# Patient Record
Sex: MALE | Race: WHITE | NOT HISPANIC OR LATINO | Employment: FULL TIME | URBAN - METROPOLITAN AREA
[De-identification: names, ages, dates, MRNs, and addresses within clinical notes are randomized per-mention and may not be internally consistent; named-entity substitution may affect disease eponyms.]

---

## 2017-05-03 ENCOUNTER — OFFICE VISIT (OUTPATIENT)
Dept: CARDIOLOGY | Facility: CLINIC | Age: 47
End: 2017-05-03
Attending: INTERNAL MEDICINE
Payer: COMMERCIAL

## 2017-05-03 VITALS
DIASTOLIC BLOOD PRESSURE: 84 MMHG | WEIGHT: 233 LBS | BODY MASS INDEX: 33.36 KG/M2 | HEART RATE: 68 BPM | HEIGHT: 70 IN | SYSTOLIC BLOOD PRESSURE: 130 MMHG

## 2017-05-03 DIAGNOSIS — E88.810 METABOLIC SYNDROME: ICD-10-CM

## 2017-05-03 DIAGNOSIS — Z95.5 STATUS POST INSERTION OF DRUG ELUTING CORONARY ARTERY STENT: ICD-10-CM

## 2017-05-03 DIAGNOSIS — I25.10 CORONARY ARTERY DISEASE INVOLVING NATIVE CORONARY ARTERY OF NATIVE HEART WITHOUT ANGINA PECTORIS: Primary | ICD-10-CM

## 2017-05-03 DIAGNOSIS — E78.5 HYPERLIPIDEMIA LDL GOAL <130: ICD-10-CM

## 2017-05-03 PROCEDURE — 99214 OFFICE O/P EST MOD 30 MIN: CPT | Performed by: INTERNAL MEDICINE

## 2017-05-03 RX ORDER — ATORVASTATIN CALCIUM 80 MG/1
TABLET, FILM COATED ORAL
COMMUNITY
Start: 2017-03-02 | End: 2018-05-14

## 2017-05-03 NOTE — MR AVS SNAPSHOT
After Visit Summary   5/3/2017    Henrique Nelson    MRN: [id number]           Patient Information     Date Of Birth          1970        Visit Information        Provider Department      5/3/2017 7:45 AM Silvio Michael MD [facility]        Today's Diagnoses     Coronary artery disease involving native coronary artery of native heart without angina pectoris        Hyperlipidemia LDL goal <130        Elevated blood pressure (not hypertension)        Metabolic syndrome           Follow-ups after your visit        Additional Services     Follow-Up with Cardiologist                 Future tests that were ordered for you today     Open Future Orders        Priority Expected Expires Ordered    Lipid Profile Routine 5/3/2018 6/7/2018 5/3/2017    Exercise Stress Echocardiogram Routine 5/3/2018 6/7/2018 5/3/2017    Follow-Up with Cardiologist Routine 5/3/2018 9/15/2018 5/3/2017            Who to contact     If you have questions or need follow up information about today's clinic visit or your schedule please contact [facility] directly at [phone].  Normal or non-critical lab and imaging results will be communicated to you by PageFairhart, letter or phone within 4 business days after the clinic has received the results. If you do not hear from us within 7 days, please contact the clinic through Backpackt or phone. If you have a critical or abnormal lab result, we will notify you by phone as soon as possible.  Submit refill requests through Sagoon or call your pharmacy and they will forward the refill request to us. Please allow 3 business days for your refill to be completed.          Additional Information About Your Visit        PageFairhart Information     Sagoon gives you secure access to your electronic health record. If you see a primary care provider, you can also send messages to your care team and make appointments.  "If you have questions, please call your primary care clinic.  If you do not have a primary care provider, please call [phone] and they will assist you.        Care EveryWhere ID     This is your Care EveryWhere ID. This could be used by other organizations to access your [facility] medical records  [id number]        Your Vitals Were     Pulse Height BMI (Body Mass Index)             68 1.778 m (5' 10\") 33.43 kg/m2          Blood Pressure from Last 3 Encounters:   05/03/17 130/84   09/12/16 120/70   08/29/16 116/64    Weight from Last 3 Encounters:   05/03/17 105.7 kg (233 lb)   09/12/16 103.9 kg (229 lb)   08/22/16 103.4 kg (228 lb)              We Performed the Following     Follow-Up with Cardiologist          Today's Medication Changes          These changes are accurate as of: 5/3/17  8:10 AM.  If you have any questions, ask your nurse or doctor.               Stop taking these medicines if you haven't already. Please contact your care team if you have questions.     SIMVASTATIN PO   Stopped by:  Silvio Michael MD                    Primary Care Provider Office Phone # Fax #    Gregory G Schoen, [phone]       [facility] [address] [place] [address]        Thank you!     Thank you for choosing [facility]  for your care. Our goal is always to provide you with excellent care. Hearing back from our patients is one way we can continue to improve our services. Please take a few minutes to complete the written survey that you may receive in the mail after your visit with us. Thank you!             Your Updated Medication List - Protect others around you: Learn how to safely use, store and throw away your medicines at www.disposemymeds.org.          This list is accurate as of: 5/3/17  8:10 AM.  Always use your most recent med list.                   Brand Name Dispense Instructions for use    albuterol 108 (90 BASE) " MCG/ACT Inhaler   Generic drug:  albuterol     1    INHALE 1 TO 2 PUFFS EVERY 4 TO 6 HOURS AS NEEDED       aspirin 325 MG EC tablet      ONE DAILY       atorvastatin 80 MG tablet    LIPITOR         nitroglycerin 0.4 MG sublingual tablet    NITROSTAT    60 tablet    Place 1 tablet under the tongue See Admin Instructions. for chest pain

## 2017-05-03 NOTE — PROGRESS NOTES
HPI and Plan:   See dictation:[id number]    Orders Placed This Encounter   Procedures     Lipid Profile     Follow-Up with Cardiologist     Exercise Stress Echocardiogram       Orders Placed This Encounter   Medications     atorvastatin (LIPITOR) 80 MG tablet       Medications Discontinued During This Encounter   Medication Reason     SIMVASTATIN PO Alternate therapy         Encounter Diagnoses   Name Primary?     Coronary artery disease involving native coronary artery of native heart without angina pectoris      Hyperlipidemia LDL goal <130      Elevated blood pressure (not hypertension)      Metabolic syndrome        CURRENT MEDICATIONS:  Current Outpatient Prescriptions   Medication Sig Dispense Refill     nitroglycerin (NITROSTAT) 0.4 MG SL tablet Place 1 tablet under the tongue See Admin Instructions. for chest pain 60 tablet 3     ASPIRIN 325 MG PO TBEC ONE DAILY  3     PROAIR  (90 BASE) MCG/ACT IN AERS INHALE 1 TO 2 PUFFS EVERY 4 TO 6 HOURS AS NEEDED 1 1 YEAR     atorvastatin (LIPITOR) 80 MG tablet        [DISCONTINUED] indomethacin (INDOCIN) 50 MG capsule Take 1 capsule (50 mg) by mouth 3 times daily (with meals) for 10 days 30 capsule 0       ALLERGIES     Allergies   Allergen Reactions     Cat Hair [Cats]      No Known Drug Allergies        PAST MEDICAL HISTORY:  Past Medical History:   Diagnosis Date     Allergic rhinitis, cause unspecified     Allergic rhinitis     CAD (coronary artery disease)     Drug eluting stent to occluded diagonal on 4/14/10     Esophageal reflux      HLD (hyperlipidaemia)      Metabolic syndrome      Mild intermittent asthma      NONSPECIFIC MEDICAL HISTORY 1997    Avulsion fracture, right ankle     Old myocardial infarction April 2010 April 2010       PAST SURGICAL HISTORY:  Past Surgical History:   Procedure Laterality Date     C NONSPECIFIC PROCEDURE  1996    Sinus surgery     CARDIAC CATHERIZATION  04/14/10     CORONARY ANGIOGRAPHY ADULT ORDER  4/14/10    4/14/10: JOELLEN  "to totally occluded diagonal     HC TOOTH EXTRACTION W/FORCEP      [place] teeth     STENT, CORONARY, BIJAL  4/2010       FAMILY HISTORY:  Family History   Problem Relation Age of Onset     Hypertension Maternal Grandfather      Allergies Sister      Breast Cancer Mother      CANCER Mother      Ovarian     DIABETES Maternal Aunt      insulin       SOCIAL HISTORY:  Social History     Social History     Marital status:      Spouse name: Alberta     Number of children: 0     Years of education: N/A     Occupational History     Shop work      Social History Main Topics     Smoking status: Former Smoker     Types: Cigars     Smokeless tobacco: Never Used      Comment: 04/12/2010     Alcohol use 0.0 oz/week     0 Standard drinks or equivalent per week      Comment: occasional     Drug use: No     Sexual activity: Yes     Partners: Female     Other Topics Concern     Parent/Sibling W/ Cabg, Mi Or Angioplasty Before 65f 55m? No     Caffeine Concern No     dt. mountain dew- 2 a day (16oz)     Exercise No     Seat Belt Yes     Social History Narrative       Review of Systems:  Skin:  Negative       Eyes:  Positive for glasses    ENT:  Negative      Respiratory:  Negative       Cardiovascular:    Positive for;fatigue    Gastroenterology: Negative      Genitourinary:  not assessed      Musculoskeletal:  Negative      Neurologic:  Negative      Psychiatric:  Negative      Heme/Lymph/Imm:  Positive for allergies    Endocrine:  Negative        Physical Exam:  Vitals: /84 (BP Location: Left arm, Cuff Size: Adult Large)  Pulse 68  Ht 1.778 m (5' 10\")  Wt 105.7 kg (233 lb)  BMI 33.43 kg/m2    Constitutional:  cooperative, alert and oriented, well developed, well nourished, in no acute distress overweight      Skin:  warm and dry to the touch, no apparent skin lesions or masses noted        Head:  normocephalic, no masses or lesions        Eyes:  pupils equal and round, conjunctivae and lids unremarkable, sclera white, no " xanthalasma, EOMS intact, no nystagmus        ENT:  no pallor or cyanosis, dentition good        Neck:  carotid pulses are full and equal bilaterally, JVP normal, no carotid bruit, no thyromegaly        Chest:  normal breath sounds, clear to auscultation, normal A-P diameter, normal symmetry, normal respiratory excursion, no use of accessory muscles          Cardiac: regular rhythm, normal S1/S2, no S3 or S4, apical impulse not displaced, no murmurs, gallops or rubs                  Abdomen:  abdomen soft, non-tender, BS normoactive, no mass, no HSM, no bruits        Vascular: pulses full and equal, no bruits auscultated                                        Extremities and Back:  no deformities, clubbing, cyanosis, erythema observed;no edema              Neurological:  affect appropriate, oriented to time, person and place;no gross motor deficits              CC  Silvio Michael MD   [facility]  [address]  [place] [address]

## 2017-05-03 NOTE — PROGRESS NOTES
HISTORY OF PRESENT ILLNESS:  I again had the pleasure of seeing your patient, Henrique Nelson, at [facility] for evaluation of coronary artery disease and hyperlipidemia.  The patient is status post subendocardial myocardial infarction in 04/2010 with a totally occluded diagonal branch artery.  This was stented using a drug-eluting stent and the patient continues to deny angina pectoris.  His last stress echocardiogram 04/22/2013 demonstrated no evidence of ischemia or infarction.  The patient was able to exercise 12 minutes free of angina.  He did not have any valvular abnormalities.  The patient's exercise continues to diminish, now 1 or 2 times per week.  He walks on a treadmill for 20 minutes and uses a stationary bike for 15 minutes free of angina or shortness of breath.  He is a bit troubled by asthma this spring.  He is not smoking.  He denies myalgias or myopathy.  We have not performed a fasting lipid profile this year since they have been so stable the previous 3 years.  He notes that even his father has lost 35 pounds and is now using a gym membership.  The patient has reduced his red meat consumption and has now increased pork, fish and chicken.      PHYSICAL EXAMINATION:  Current blood pressure is 130/84, pulse is 68 and regular, weight is 233 pounds, an increase of 8 pounds from last year.  BMI is 34.  Chest is clear to auscultation.  Cardiac exam:  Regular rate and rhythm, normal S1 and S2 without gallop or murmur.  No JVD.  Pulses are intact without bruits.  Abdomen is soft, nontender without organomegaly.  No bruits.  Extremities without cyanosis, clubbing or edema.      ASSESSMENT:   1.  Henrique Nelson is a delightful 46-year-old male status post diagonal branch artery myocardial infarction and intracoronary stenting in 04/2010.  Last stress test was 2013 and was normal.  We are going to repeat this stress echocardiogram in 1 year.  The patient remains on aspirin and  atorvastatin lifelong.  His blood pressure is well controlled.  I spent 30 minutes with this patient, greater than 50% of that time was counseling on diet, exercise and weight loss.  We also discussed the anatomy of a coronary artery and why it is imperative that he exercises aerobically 4 times per week.   2.  The patient remains on high-dose atorvastatin with excellent lipid profile last year.  He denies myalgias or myopathy.  We will repeat this test in 1 year.      It is my pleasure to assist in the care of Mr. Henrique Casas.  I appreciate the opportunity to care for him.  I will see him again in 1 year or earlier on a p.r.n. basis.  All his questions were answered to his satisfaction.      Vida Wright MD      cc:   Gregory Schoen, MD   [address] [address]         VDIA WRIGHT MD, [facility]             D: 2017 08:16   T: 2017 14:54   MT: PHOEBE      Name:     HENRIQUE CASAS   MRN:      [id number]        Account:      [id number]   :      1970           Service Date: 2017      Document: [id number]

## 2017-05-03 NOTE — LETTER
5/3/2017    Gregory G. Schoen, MD  [facility]   [address]   [place] [address]    RE: Henrique Nelson       Dear Colleague,    I again had the pleasure of seeing your patient, Henrique Nelson, at [facility] for evaluation of coronary artery disease and hyperlipidemia.  The patient is status post subendocardial myocardial infarction in 04/2010 with a totally occluded diagonal branch artery.  This was stented using a drug-eluting stent and the patient continues to deny angina pectoris.  His last stress echocardiogram 04/22/2013 demonstrated no evidence of ischemia or infarction.  The patient was able to exercise 12 minutes free of angina.  He did not have any valvular abnormalities.  The patient's exercise continues to diminish, now 1 or 2 times per week.  He walks on a treadmill for 20 minutes and uses a stationary bike for 15 minutes free of angina or shortness of breath.  He is a bit troubled by asthma this spring.  He is not smoking.  He denies myalgias or myopathy.  We have not performed a fasting lipid profile this year since they have been so stable the previous 3 years.  He notes that even his father has lost 35 pounds and is now using a gym membership.  The patient has reduced his red meat consumption and has now increased pork, fish and chicken.      PHYSICAL EXAMINATION:  Current blood pressure is 130/84, pulse is 68 and regular, weight is 233 pounds, an increase of 8 pounds from last year.  BMI is 34.  Chest is clear to auscultation.  Cardiac exam:  Regular rate and rhythm, normal S1 and S2 without gallop or murmur.  No JVD.  Pulses are intact without bruits.  Abdomen is soft, nontender without organomegaly.  No bruits.  Extremities without cyanosis, clubbing or edema.     Outpatient Encounter Prescriptions as of 5/3/2017   Medication Sig Dispense Refill     nitroglycerin (NITROSTAT) 0.4 MG SL tablet Place 1 tablet under the tongue See Admin Instructions. for  chest pain 60 tablet 3     ASPIRIN 325 MG PO TBEC ONE DAILY  3     PROAIR  (90 BASE) MCG/ACT IN AERS INHALE 1 TO 2 PUFFS EVERY 4 TO 6 HOURS AS NEEDED 1 1 YEAR     atorvastatin (LIPITOR) 80 MG tablet        [DISCONTINUED] SIMVASTATIN PO        No facility-administered encounter medications on file as of 5/3/2017.       ASSESSMENT:   1.  Henrique Nelson is a delightful 46-year-old male status post diagonal branch artery myocardial infarction and intracoronary stenting in 04/2010.  Last stress test was 2013 and was normal.  We are going to repeat this stress echocardiogram in 1 year.  The patient remains on aspirin and atorvastatin lifelong.  His blood pressure is well controlled.  I spent 30 minutes with this patient, greater than 50% of that time was counseling on diet, exercise and weight loss.  We also discussed the anatomy of a coronary artery and why it is imperative that he exercises aerobically 4 times per week.   2.  The patient remains on high-dose atorvastatin with excellent lipid profile last year.  He denies myalgias or myopathy.  We will repeat this test in 1 year.      It is my pleasure to assist in the care of Mr. Henrique Nelson.  I appreciate the opportunity to care for him.  I will see him again in 1 year or earlier on a p.r.n. basis.  All his questions were answered to his satisfaction.     Sincerely,    Silvio Michael MD     [facility]

## 2017-06-13 DIAGNOSIS — E78.5 HYPERLIPIDEMIA LDL GOAL <130: ICD-10-CM

## 2017-06-14 RX ORDER — ATORVASTATIN CALCIUM 80 MG/1
TABLET, FILM COATED ORAL
Qty: 90 TABLET | Refills: 3 | Status: SHIPPED | OUTPATIENT
Start: 2017-06-14 | End: 2018-08-16

## 2017-06-14 NOTE — TELEPHONE ENCOUNTER
atorvastatin (LIPITOR) 80 MG tablet      Last Written Prescription Date:  3/2/17  Last Fill Quantity: ?,   # refills: ?  Last Office Visit with [facility], [facility] or [facility] prescribing provider: 8/4/16  Future Office visit:       Routing refill request to provider for review/approval because:  Medication is reported/historical

## 2017-10-25 ENCOUNTER — OFFICE VISIT (OUTPATIENT)
Dept: URGENT CARE | Facility: RETAIL CLINIC | Age: 47
End: 2017-10-25
Payer: COMMERCIAL

## 2017-10-25 VITALS
SYSTOLIC BLOOD PRESSURE: 128 MMHG | TEMPERATURE: 96.2 F | OXYGEN SATURATION: 96 % | HEART RATE: 71 BPM | DIASTOLIC BLOOD PRESSURE: 80 MMHG

## 2017-10-25 DIAGNOSIS — S30.861A TICK BITE OF ABDOMEN, INITIAL ENCOUNTER: Primary | ICD-10-CM
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
## 2017-10-25 DIAGNOSIS — A69.20 LYME DISEASE: ICD-10-CM

## 2017-10-25 PROCEDURE — 99213 OFFICE O/P EST LOW 20 MIN: CPT | Performed by: PHYSICIAN ASSISTANT

## 2017-10-25 RX ORDER — DOXYCYCLINE HYCLATE 100 MG
200 TABLET ORAL ONCE
Qty: 2 TABLET | Refills: 0 | Status: SHIPPED | OUTPATIENT
Start: 2017-10-25 | End: 2017-10-25

## 2017-10-25 NOTE — MR AVS SNAPSHOT
After Visit Summary   10/25/2017    Henrique Nelson    MRN: [id number]           Patient Information     Date Of Birth          1970        Visit Information        Provider Department      10/25/2017 10:00 AM Anneliese Mayberry PA-C [facility]        Today's Diagnoses     Lyme disease    -  1      Care Instructions      Preventing Lyme Disease  Ticks are small spider-like arachnids that feed on the blood of animals and humans. They can carry the bacteria that cause Lyme disease. The bacteria can pass to a person from a tick bite. (It is not passed from person to person.) Lyme disease can lead to serious health problems if it is not treated with antibiotics. The best way to prevent Lyme disease is to avoid being bitten by a tick.     The tick that carries Lyme disease is very small--about the size of a poppy seed.   Preventing tick bites  The disease is carried by the blacklegged tick, also called a  deer tick.  Young ticks called nymphs are the most likely to carry the bacteria. They are very small--about the size of a poppy seed. They can be found on deer, rodents, and birds. Often the animal brushes the tick onto leaves or other plants as it runs through the woods and then the tick lives in bushes, grasses, and dead leaves. The most active time of year for infected ticks varies by region of the country. In the [place] and [place], they are more active from April to September. In the [place], they may be more active from December to February. But you can still be bitten at other times of the year. Below are tips for protecting yourself from tick bites.  Protect your body    Wear clothes that protect you. Clothing can help protect you from tick bites. Wear long pants and long sleeves in outdoor areas where ticks may live. Tuck your shirt into your pants. Tuck pant legs into your socks. And wear light colors so you can more easily see ticks on your clothes.    Use insect repellent.  Spray insect repellent containing at least 20 percent DEET on your exposed skin. You can also use it on clothing, shoes, and camping gear. Avoid getting DEET on children s hands, mouth, or eyes. You can use products with permethrin on clothing, shoes, and camping gear. But do not spray permethrin on skin. It will cause a rash. Follow the directions on the package of the spray you use. For more information on bug sprays, visit the National Pesticide Information Center at http://npic.[facility].    Avoid tick-infested areas. Avoid brushing against grasses, bushes, and other plants. Avoid walking through dead leaves and other ground vegetation. Do not sit on fallen logs. And avoid areas with large numbers of deer and rodents.    Check yourself for ticks. After being outdoors, check your clothes and skin for ticks. Keep in mind that the ticks are about the size of a poppy seed. Use both a hand-held and a full-length mirror to view all of your skin. Pay special attention to areas with hair. Make sure to thoroughly check these areas:    Scalp    Behind the ears    Armpits    Belly button    Waist    Groin    Backs of the knees    Use the clothes dryer. Putting clothing or bedding into a clothes dryer for 1 hour at high heat has been shown to kill ticks.  Control ticks around your home    Create tick-free safety zones. Ticks that transmit Lyme disease live in ground vegetation. Ticks crawl onto people from shrubs and grasses in and around wooded areas. Cut bushes and other plants away from the deck or patio and any child play areas. Keep all grasses cut short. Remove dead leaves and other dead vegetation. Do not put children s play equipment near wooded areas. Put wood chips or gravel on the ground between lawns and wooded areas.    Use pesticides. You can apply them yourself or hire a pest control expert. States have different regulations about pesticides. Ask your local health department for information.    Keep deer away.  Deer often carry the ticks that can infect you with Lyme disease. Do not attempt to pet or feed deer. Ask your local [place] center about deer-resistant plants. Ask your local health department about deer control in your area.    Prevent ticks on pets. Pets can bring ticks into your home. Use tick control medicine as advised by your . Check your pet for ticks after it comes indoors. Pay special attention to the ears.    Ask about local tick-control methods. Some states have tick-control programs. Local health departments may be using methods that can help you control ticks at home.  If you have a tick  If you find a tick on your skin, do not panic. Most ticks don't carry Lyme disease. And the tick must be attached for 36 to 48 hours before it might infect you. If you find a tick on you, here s what to do:    If the tick is not yet attached to your skin, remove the tick with tweezers or a tissue. Flush it down the toilet. If you see a tick on your clothes, use a piece of tape to lift it off. Do not touch it with your bare hands.    If the tick is attached to your skin:    Carefully remove the tick with tweezers. If you don t have tweezers, use your fingers protected by a paper towel or a thin cloth. Grasp the tick as close to your skin as possible. Pull slowly and gently to remove the tick. The tick may not let go right away. Do not pull harder. Be patient and keep trying gently. Do not twist the head or body as you pull. Do not crush or squeeze the body. This can release the bacteria into your body. Never use a hot match, petroleum jelly, or other products to remove a tick. (If you can t remove the tick or if part of the tick remains in the skin, call your healthcare provider right away.)    Wash your skin with soap and water after you remove the tick. This will help ensure you remove any bacteria.    If you can, save the tick in a tightly sealed glass or plastic container. Take it to your healthcare  provider. He or she may be able to have someone identify if it is the type of tick that transmits Lyme.    Call your healthcare provider and describe the bite and the tick. You may be asked to come in for an exam. You may be tested for Lyme. You may also be prescribed antibiotics to help prevent infection.    Over the next month, watch for the symptoms below, especially a rash at the site of the bite.  Symptoms of Lyme disease  Call your healthcare provider if you develop any symptoms of Lyme disease, even if you don t remember being bitten. These include:    A round, red rash (called a bull s-eye rash)    Fever and chills    Tiredness or body aches    Headache or a stiff neck    Joint pain and swelling (arthritis), especially in large joints such as the knees   To learn more    Centers for Disease Control and Prevention: www.cdc.gov/lyme    American Lyme Disease Foundation: www.aldf.com  Date Last Reviewed: 11/1/2016 2000-2017 [employer]. [address] [place] [address]. All rights reserved. This information is not intended as a substitute for professional medical care. Always follow your healthcare professional's instructions.        Tick Bite (Antibiotic Treatment)    Ticks are small arachnids that feed on the blood of rodents, rabbits, birds, deer, dogs and humans. The bite may cause a local reaction like that of a spider, with a small amount of local redness, itching and slight swelling. Sometimes there is no local reaction.  Most tick bites are harmless. But some ticks carry diseases, such as Lyme disease or [place] spotted fever. These can be passed to people at the time of the bite. Lyme disease is of greatest concern. Right now you have no symptoms of Lyme disease or other serious reaction to the bite. It is important to watch for the warning signs, which could appear weeks to months after the tick bite.  Home care  The following guidelines can help you care for your bite  at home:    If itching is a problem, avoid tight clothing and anything that heats up your skin. This includes hot showers or baths and direct sunlight. This often makes the itching worse.    An ice pack will reduce local areas of redness and itching. Make your own ice pack by putting ice cubes in a zip-top plastic bag and wrapping it in a thin towel. Over-the-counter creams containing benzocaine may help with itching.    You can use an antihistamine with diphenhydramine if your doctor did not give you another antihistamine. This medicine may be used to reduce itching if large areas of the skin are involved. It is available at drugstores and grocery stores. If symptoms continue, talk with your doctor or pharmacist about other over-the counter medicines that may be helpful.    Your doctor may prescribe antibiotics to reduce your risk of getting Lyme disease. It is very important that you take them exactly as directed until they are completely finished.  Follow-up care  Follow up with your healthcare provider, or as advised.  Call 911  Call 911 if any of these occur:    Irregular or rapid heartbeat    Numbness, tingling, or weakness in the arms or legs  When to seek medical advice  Call your healthcare provider right away if any of these occur:  Signs of local infection. Watch for these during the next few days.    Increasing redness around the bite site    Increased pain or swelling    Fever over 100.4 F (38.0 C), or as directed by your healthcare provider    Fluid draining from the bite area  Signs of tick-related disease. Watch for these over the next few weeks to months.    Circular, red, ring-like rash appears at the bite area within 1 to 3 weeks    Tiredness, fever or chills, nausea or vomiting    Neck pain or stiffness, headache, or confusion    Muscle or bone aches    Joint pain or swelling, especially in the knee    Weakness on one side of the face  Date Last Reviewed: 10/1/2016    5494-1347 [employer]. [address] [place] [address]. All rights reserved. This information is not intended as a substitute for professional medical care. Always follow your healthcare professional's instructions.        Please FOLLOW UP at primary care clinic if not improving, new symptoms, worse or this does not resolve.  [facility]  [phone]            Follow-ups after your visit        Who to contact     You can reach your care team any time of the day by calling [phone].  Notification of test results:  If you have an abnormal lab result, we will notify you by phone as soon as possible.         Additional Information About Your Visit        MyChart Information     [employer] gives you secure access to your electronic health record. If you see a primary care provider, you can also send messages to your care team and make appointments. If you have questions, please call your primary care clinic.  If you do not have a primary care provider, please call [phone] and they will assist you.        Care EveryWhere ID     This is your Care EveryWhere ID. This could be used by other organizations to access your [facility] medical records  [id number]        Your Vitals Were     Pulse Temperature Pulse Oximetry             71 96.2  F (35.7  C) (Tympanic) 96%          Blood Pressure from Last 3 Encounters:   10/25/17 128/80   05/03/17 130/84   09/12/16 120/70    Weight from Last 3 Encounters:   05/03/17 233 lb (105.7 kg)   09/12/16 229 lb (103.9 kg)   08/22/16 228 lb (103.4 kg)              Today, you had the following     No orders found for display         Today's Medication Changes          These changes are accurate as of: 10/25/17 10:18 AM.  If you have any questions, ask your nurse or doctor.               Start taking these medicines.        Dose/Directions    doxycycline 100 MG tablet   Commonly known as:  VIBRA-TABS   Used for:  Lyme disease   Started by:  Anneliese Mayberry PA-C         Dose:  200 mg   Take 2 tablets (200 mg) by mouth once for 1 dose   Quantity:  2 tablet   Refills:  0            Where to get your medicines      These medications were sent to [facility] - [place], [place] - [address]  [address], [facility] [address]     Phone:  [phone]     doxycycline 100 MG tablet                Primary Care Provider Office Phone # Fax #    Gregory G Schoen, [phone]       [address] [place] [address]        Equal Access to Services     [facility]: Hadii aad ku hadasho Soomaali, waaxda luqadaha, qaybta kaalmada adeegyada, waxay idiin hayneerajn catalinoeg og gonzalez . So [facility] [phone].    ATENCIÓN: Si habla español, tiene a shirley disposición servicios gratuitos de asistencia lingüística. [facility] [phone].    We comply with applicable federal civil rights laws and [place] laws. We do not discriminate on the basis of race, color, national origin, age, disability, sex, sexual orientation, or gender identity.            Thank you!     Thank you for choosing [facility]  for your care. Our goal is always to provide you with excellent care. Hearing back from our patients is one way we can continue to improve our services. Please take a few minutes to complete the written survey that you may receive in the mail after your visit with us. Thank you!             Your Updated Medication List - Protect others around you: Learn how to safely use, store and throw away your medicines at www.disposemymeds.org.          This list is accurate as of: 10/25/17 10:18 AM.  Always use your most recent med list.                   Brand Name Dispense Instructions for use Diagnosis    aspirin 325 MG EC tablet      ONE DAILY        * atorvastatin 80 MG tablet    LIPITOR          * atorvastatin 80 MG tablet    LIPITOR    90 tablet    TAKE ONE TABLET BY MOUTH EVERY DAY    Hyperlipidemia LDL goal <130       doxycycline 100 MG tablet    VIBRA-TABS    2  tablet    Take 2 tablets (200 mg) by mouth once for 1 dose    Lyme disease       nitroGLYcerin 0.4 MG sublingual tablet    NITROSTAT    60 tablet    Place 1 tablet under the tongue See Admin Instructions. for chest pain    CAD (coronary artery disease)       PROAIR  (90 BASE) MCG/ACT Inhaler   Generic drug:  albuterol     1    INHALE 1 TO 2 PUFFS EVERY 4 TO 6 HOURS AS NEEDED    Unspecified asthma, with exacerbation       * Notice:  This list has 2 medication(s) that are the same as other medications prescribed for you. Read the directions carefully, and ask your doctor or other care provider to review them with you.

## 2017-10-25 NOTE — PROGRESS NOTES
S:  [facility].  Pt. presents to clinic concerned about a tick bite and Lymes risk.     tick located on low mid abd - noticed it yest -removed yest. Tick legs were moving so only 'on' day or so.  Successful removal by spouse (?)    ROS:  ENT - denies ear pain, throat pain. no nasal congestion.  CP - no cough,SOB or chest pain.   GI/ - Appetite - no change. No nausea, vomiting or diarrhea.   No bowel or bladder changes   MSK - no joint pain or swelling.   Skin-  no rashes.       Past Medical History:   Diagnosis Date     Allergic rhinitis, cause unspecified     Allergic rhinitis     CAD (coronary artery disease)     Drug eluting stent to occluded diagonal on 4/14/10     Esophageal reflux      HLD (hyperlipidaemia)      Metabolic syndrome      Mild intermittent asthma      NONSPECIFIC MEDICAL HISTORY 1997    Avulsion fracture, right ankle     Old myocardial infarction April 2010 April 2010     Past Surgical History:   Procedure Laterality Date     C NONSPECIFIC PROCEDURE  1996    Sinus surgery     CARDIAC CATHERIZATION  04/14/10     CORONARY ANGIOGRAPHY ADULT ORDER  4/14/10    4/14/10: JOELLEN to totally occluded diagonal     HC TOOTH EXTRACTION W/FORCEP      Tully teeth     STENT, CORONARY, BIJAL  4/2010     Patient Active Problem List   Diagnosis     Esophageal reflux     NONSPECIFIC MEDICAL HISTORY     Elevated blood pressure (not hypertension)     CAD (coronary artery disease)     Flank pain     Hyperlipidemia LDL goal <130     Old myocardial infarction     Metabolic syndrome     Mild intermittent asthma without complication     Burn of foot, third degree, right, sequela     Status post insertion of drug eluting coronary artery stent     Current Outpatient Prescriptions   Medication     atorvastatin (LIPITOR) 80 MG tablet     nitroglycerin (NITROSTAT) 0.4 MG SL tablet     ASPIRIN 325 MG PO TBEC     PROAIR  (90 BASE) MCG/ACT IN AERS     atorvastatin (LIPITOR) 80 MG tablet     [DISCONTINUED]  indomethacin (INDOCIN) 50 MG capsule     No current facility-administered medications for this visit.              O: /80  Pulse 71  Temp 96.2  F (35.7  C) (Tympanic)  SpO2 96%    Tick site low abd. Well healed. No FB noted. Surronding induration 1cm  Bacitracin dressing applied.    A:    Lyme disease  Tick bite of abdomen, initial encounter      P: Doxy 200 PO x 1 dose -side effects discussed.   S/S of Lymes disease reviewed.   If fever, arthralgia, rash, malaise will need further FU with PCP.    Wound care discussed. Watch for any secondary sign of infection and FOLLOW UP with PCP if occurs.      AVS given and discussed:  Patient Instructions       Preventing Lyme Disease  Ticks are small spider-like arachnids that feed on the blood of animals and humans. They can carry the bacteria that cause Lyme disease. The bacteria can pass to a person from a tick bite. (It is not passed from person to person.) Lyme disease can lead to serious health problems if it is not treated with antibiotics. The best way to prevent Lyme disease is to avoid being bitten by a tick.     The tick that carries Lyme disease is very small--about the size of a poppy seed.   Preventing tick bites  The disease is carried by the blacklegged tick, also called a  deer tick.  Young ticks called nymphs are the most likely to carry the bacteria. They are very small--about the size of a poppy seed. They can be found on deer, rodents, and birds. Often the animal brushes the tick onto leaves or other plants as it runs through the woods and then the tick lives in bushes, grasses, and dead leaves. The most active time of year for infected ticks varies by region of the country. In the [place] and [place], they are more active from April to September. In the [place], they may be more active from December to February. But you can still be bitten at other times of the year. Below are tips for protecting yourself from tick bites.  Protect your body    Wear  clothes that protect you. Clothing can help protect you from tick bites. Wear long pants and long sleeves in outdoor areas where ticks may live. Tuck your shirt into your pants. Tuck pant legs into your socks. And wear light colors so you can more easily see ticks on your clothes.    Use insect repellent. Spray insect repellent containing at least 20 percent DEET on your exposed skin. You can also use it on clothing, shoes, and camping gear. Avoid getting DEET on children s hands, mouth, or eyes. You can use products with permethrin on clothing, shoes, and camping gear. But do not spray permethrin on skin. It will cause a rash. Follow the directions on the package of the spray you use. For more information on bug sprays, visit the National Pesticide Information Center at http://npic.[facility].edu.    Avoid tick-infested areas. Avoid brushing against grasses, bushes, and other plants. Avoid walking through dead leaves and other ground vegetation. Do not sit on fallen logs. And avoid areas with large numbers of deer and rodents.    Check yourself for ticks. After being outdoors, check your clothes and skin for ticks. Keep in mind that the ticks are about the size of a poppy seed. Use both a hand-held and a full-length mirror to view all of your skin. Pay special attention to areas with hair. Make sure to thoroughly check these areas:    Scalp    Behind the ears    Armpits    Belly button    Waist    Groin    Backs of the knees    Use the clothes dryer. Putting clothing or bedding into a clothes dryer for 1 hour at high heat has been shown to kill ticks.  Control ticks around your home    Create tick-free safety zones. Ticks that transmit Lyme disease live in ground vegetation. Ticks crawl onto people from shrubs and grasses in and around wooded areas. Cut bushes and other plants away from the deck or patio and any child play areas. Keep all grasses cut short. Remove dead leaves and other dead vegetation. Do not put  children s play equipment near wooded areas. Put wood chips or gravel on the ground between lawns and wooded areas.    Use pesticides. You can apply them yourself or hire a pest control expert. States have different regulations about pesticides. Ask your local health department for information.    Keep deer away. Deer often carry the ticks that can infect you with Lyme disease. Do not attempt to pet or feed deer. Ask your local [place] center about deer-resistant plants. Ask your local health department about deer control in your area.    Prevent ticks on pets. Pets can bring ticks into your home. Use tick control medicine as advised by your . Check your pet for ticks after it comes indoors. Pay special attention to the ears.    Ask about local tick-control methods. Some states have tick-control programs. Local health departments may be using methods that can help you control ticks at home.  If you have a tick  If you find a tick on your skin, do not panic. Most ticks don't carry Lyme disease. And the tick must be attached for 36 to 48 hours before it might infect you. If you find a tick on you, here s what to do:    If the tick is not yet attached to your skin, remove the tick with tweezers or a tissue. Flush it down the toilet. If you see a tick on your clothes, use a piece of tape to lift it off. Do not touch it with your bare hands.    If the tick is attached to your skin:    Carefully remove the tick with tweezers. If you don t have tweezers, use your fingers protected by a paper towel or a thin cloth. Grasp the tick as close to your skin as possible. Pull slowly and gently to remove the tick. The tick may not let go right away. Do not pull harder. Be patient and keep trying gently. Do not twist the head or body as you pull. Do not crush or squeeze the body. This can release the bacteria into your body. Never use a hot match, petroleum jelly, or other products to remove a tick. (If you can t remove  the tick or if part of the tick remains in the skin, call your healthcare provider right away.)    Wash your skin with soap and water after you remove the tick. This will help ensure you remove any bacteria.    If you can, save the tick in a tightly sealed glass or plastic container. Take it to your healthcare provider. He or she may be able to have someone identify if it is the type of tick that transmits Lyme.    Call your healthcare provider and describe the bite and the tick. You may be asked to come in for an exam. You may be tested for Lyme. You may also be prescribed antibiotics to help prevent infection.    Over the next month, watch for the symptoms below, especially a rash at the site of the bite.  Symptoms of Lyme disease  Call your healthcare provider if you develop any symptoms of Lyme disease, even if you don t remember being bitten. These include:    A round, red rash (called a bull s-eye rash)    Fever and chills    Tiredness or body aches    Headache or a stiff neck    Joint pain and swelling (arthritis), especially in large joints such as the knees   To learn more    Centers for Disease Control and Prevention: www.cdc.gov/lyme    American Lyme Disease Foundation: www.aldf.com  Date Last Reviewed: 11/1/2016 2000-2017 [employer]. [address] [place] [address], [place] [address]. All rights reserved. This information is not intended as a substitute for professional medical care. Always follow your healthcare professional's instructions.        Tick Bite (Antibiotic Treatment)    Ticks are small arachnids that feed on the blood of rodents, rabbits, birds, deer, dogs and humans. The bite may cause a local reaction like that of a spider, with a small amount of local redness, itching and slight swelling. Sometimes there is no local reaction.  Most tick bites are harmless. But some ticks carry diseases, such as Lyme disease or Sylvester Mountain spotted fever. These can be passed to people at the  time of the bite. Lyme disease is of greatest concern. Right now you have no symptoms of Lyme disease or other serious reaction to the bite. It is important to watch for the warning signs, which could appear weeks to months after the tick bite.  Home care  The following guidelines can help you care for your bite at home:    If itching is a problem, avoid tight clothing and anything that heats up your skin. This includes hot showers or baths and direct sunlight. This often makes the itching worse.    An ice pack will reduce local areas of redness and itching. Make your own ice pack by putting ice cubes in a zip-top plastic bag and wrapping it in a thin towel. Over-the-counter creams containing benzocaine may help with itching.    You can use an antihistamine with diphenhydramine if your doctor did not give you another antihistamine. This medicine may be used to reduce itching if large areas of the skin are involved. It is available at drugstores and grocery stores. If symptoms continue, talk with your doctor or pharmacist about other over-the counter medicines that may be helpful.    Your doctor may prescribe antibiotics to reduce your risk of getting Lyme disease. It is very important that you take them exactly as directed until they are completely finished.  Follow-up care  Follow up with your healthcare provider, or as advised.  Call 911  Call 911 if any of these occur:    Irregular or rapid heartbeat    Numbness, tingling, or weakness in the arms or legs  When to seek medical advice  Call your healthcare provider right away if any of these occur:  Signs of local infection. Watch for these during the next few days.    Increasing redness around the bite site    Increased pain or swelling    Fever over 100.4 F (38.0 C), or as directed by your healthcare provider    Fluid draining from the bite area  Signs of tick-related disease. Watch for these over the next few weeks to months.    Circular, red, ring-like rash  appears at the bite area within 1 to 3 weeks    Tiredness, fever or chills, nausea or vomiting    Neck pain or stiffness, headache, or confusion    Muscle or bone aches    Joint pain or swelling, especially in the knee    Weakness on one side of the face  Date Last Reviewed: 10/1/2016    7645-6729 [employer]. [address] [place] [id number]. All rights reserved. This information is not intended as a substitute for professional medical care. Always follow your healthcare professional's instructions.        Please FOLLOW UP at primary care clinic if not improving, new symptoms, worse or this does not resolve.  [facility]  [phone]      Patient is comfortable with this plan.  Electronically signed,  BRIANNA Mayberry, PAC

## 2017-10-25 NOTE — NURSING NOTE
"Chief Complaint   Patient presents with     Insect Bites     tick bite yesterday on abdomen        Initial /80  Pulse 71  Temp 96.2  F (35.7  C) (Tympanic)  SpO2 96% Estimated body mass index is 33.43 kg/(m^2) as calculated from the following:    Height as of 5/3/17: 5' 10\" (1.778 m).    Weight as of 5/3/17: 233 lb (105.7 kg).  Medication Reconciliation: complete     Jessica Sundet      "

## 2017-10-25 NOTE — PATIENT INSTRUCTIONS
Preventing Lyme Disease  Ticks are small spider-like arachnids that feed on the blood of animals and humans. They can carry the bacteria that cause Lyme disease. The bacteria can pass to a person from a tick bite. (It is not passed from person to person.) Lyme disease can lead to serious health problems if it is not treated with antibiotics. The best way to prevent Lyme disease is to avoid being bitten by a tick.     The tick that carries Lyme disease is very small--about the size of a poppy seed.   Preventing tick bites  The disease is carried by the blacklegged tick, also called a  deer tick.  Young ticks called nymphs are the most likely to carry the bacteria. They are very small--about the size of a poppy seed. They can be found on deer, rodents, and birds. Often the animal brushes the tick onto leaves or other plants as it runs through the woods and then the tick lives in bushes, grasses, and dead leaves. The most active time of year for infected ticks varies by region of the country. In the [place] and [place], they are more active from April to September. In the [place], they may be more active from December to February. But you can still be bitten at other times of the year. Below are tips for protecting yourself from tick bites.  Protect your body    Wear clothes that protect you. Clothing can help protect you from tick bites. Wear long pants and long sleeves in outdoor areas where ticks may live. Tuck your shirt into your pants. Tuck pant legs into your socks. And wear light colors so you can more easily see ticks on your clothes.    Use insect repellent. Spray insect repellent containing at least 20 percent DEET on your exposed skin. You can also use it on clothing, shoes, and camping gear. Avoid getting DEET on children s hands, mouth, or eyes. You can use products with permethrin on clothing, shoes, and camping gear. But do not spray permethrin on skin. It will cause a rash. Follow the directions on the  package of the spray you use. For more information on bug sprays, visit the National Pesticide Information Center at http://npic.[facility].edu.    Avoid tick-infested areas. Avoid brushing against grasses, bushes, and other plants. Avoid walking through dead leaves and other ground vegetation. Do not sit on fallen logs. And avoid areas with large numbers of deer and rodents.    Check yourself for ticks. After being outdoors, check your clothes and skin for ticks. Keep in mind that the ticks are about the size of a poppy seed. Use both a hand-held and a full-length mirror to view all of your skin. Pay special attention to areas with hair. Make sure to thoroughly check these areas:    Scalp    Behind the ears    Armpits    Belly button    Waist    Groin    Backs of the knees    Use the clothes dryer. Putting clothing or bedding into a clothes dryer for 1 hour at high heat has been shown to kill ticks.  Control ticks around your home    Create tick-free safety zones. Ticks that transmit Lyme disease live in ground vegetation. Ticks crawl onto people from shrubs and grasses in and around wooded areas. Cut bushes and other plants away from the deck or patio and any child play areas. Keep all grasses cut short. Remove dead leaves and other dead vegetation. Do not put children s play equipment near wooded areas. Put wood chips or gravel on the ground between lawns and wooded areas.    Use pesticides. You can apply them yourself or hire a pest control expert. States have different regulations about pesticides. Ask your local health department for information.    Keep deer away. Deer often carry the ticks that can infect you with Lyme disease. Do not attempt to pet or feed deer. Ask your local [place] center about deer-resistant plants. Ask your local health department about deer control in your area.    Prevent ticks on pets. Pets can bring ticks into your home. Use tick control medicine as advised by your . Check  your pet for ticks after it comes indoors. Pay special attention to the ears.    Ask about local tick-control methods. Some states have tick-control programs. Local health departments may be using methods that can help you control ticks at home.  If you have a tick  If you find a tick on your skin, do not panic. Most ticks don't carry Lyme disease. And the tick must be attached for 36 to 48 hours before it might infect you. If you find a tick on you, here s what to do:    If the tick is not yet attached to your skin, remove the tick with tweezers or a tissue. Flush it down the toilet. If you see a tick on your clothes, use a piece of tape to lift it off. Do not touch it with your bare hands.    If the tick is attached to your skin:    Carefully remove the tick with tweezers. If you don t have tweezers, use your fingers protected by a paper towel or a thin cloth. Grasp the tick as close to your skin as possible. Pull slowly and gently to remove the tick. The tick may not let go right away. Do not pull harder. Be patient and keep trying gently. Do not twist the head or body as you pull. Do not crush or squeeze the body. This can release the bacteria into your body. Never use a hot match, petroleum jelly, or other products to remove a tick. (If you can t remove the tick or if part of the tick remains in the skin, call your healthcare provider right away.)    Wash your skin with soap and water after you remove the tick. This will help ensure you remove any bacteria.    If you can, save the tick in a tightly sealed glass or plastic container. Take it to your healthcare provider. He or she may be able to have someone identify if it is the type of tick that transmits Lyme.    Call your healthcare provider and describe the bite and the tick. You may be asked to come in for an exam. You may be tested for Lyme. You may also be prescribed antibiotics to help prevent infection.    Over the next month, watch for the symptoms  below, especially a rash at the site of the bite.  Symptoms of Lyme disease  Call your healthcare provider if you develop any symptoms of Lyme disease, even if you don t remember being bitten. These include:    A round, red rash (called a bull s-eye rash)    Fever and chills    Tiredness or body aches    Headache or a stiff neck    Joint pain and swelling (arthritis), especially in large joints such as the knees   To learn more    Centers for Disease Control and Prevention: www.cdc.gov/lyme    American Lyme Disease Foundation: www.aldf.com  Date Last Reviewed: 11/1/2016 2000-2017 [employer]. [address] [place], [place] [id number]. All rights reserved. This information is not intended as a substitute for professional medical care. Always follow your healthcare professional's instructions.        Tick Bite (Antibiotic Treatment)    Ticks are small arachnids that feed on the blood of rodents, rabbits, birds, deer, dogs and humans. The bite may cause a local reaction like that of a spider, with a small amount of local redness, itching and slight swelling. Sometimes there is no local reaction.  Most tick bites are harmless. But some ticks carry diseases, such as Lyme disease or Sylvester Mountain spotted fever. These can be passed to people at the time of the bite. Lyme disease is of greatest concern. Right now you have no symptoms of Lyme disease or other serious reaction to the bite. It is important to watch for the warning signs, which could appear weeks to months after the tick bite.  Home care  The following guidelines can help you care for your bite at home:    If itching is a problem, avoid tight clothing and anything that heats up your skin. This includes hot showers or baths and direct sunlight. This often makes the itching worse.    An ice pack will reduce local areas of redness and itching. Make your own ice pack by putting ice cubes in a zip-top plastic bag and wrapping it in a thin towel.  Over-the-counter creams containing benzocaine may help with itching.    You can use an antihistamine with diphenhydramine if your doctor did not give you another antihistamine. This medicine may be used to reduce itching if large areas of the skin are involved. It is available at drugstores and grocery stores. If symptoms continue, talk with your doctor or pharmacist about other over-the counter medicines that may be helpful.    Your doctor may prescribe antibiotics to reduce your risk of getting Lyme disease. It is very important that you take them exactly as directed until they are completely finished.  Follow-up care  Follow up with your healthcare provider, or as advised.  Call 911  Call 911 if any of these occur:    Irregular or rapid heartbeat    Numbness, tingling, or weakness in the arms or legs  When to seek medical advice  Call your healthcare provider right away if any of these occur:  Signs of local infection. Watch for these during the next few days.    Increasing redness around the bite site    Increased pain or swelling    Fever over 100.4 F (38.0 C), or as directed by your healthcare provider    Fluid draining from the bite area  Signs of tick-related disease. Watch for these over the next few weeks to months.    Circular, red, ring-like rash appears at the bite area within 1 to 3 weeks    Tiredness, fever or chills, nausea or vomiting    Neck pain or stiffness, headache, or confusion    Muscle or bone aches    Joint pain or swelling, especially in the knee    Weakness on one side of the face  Date Last Reviewed: 10/1/2016    9934-4006 [employer]. [address] [place] [address], [place] [address]. All rights reserved. This information is not intended as a substitute for professional medical care. Always follow your healthcare professional's instructions.        Please FOLLOW UP at primary care clinic if not improving, new symptoms, worse or this does not resolve.  [facility]  [place]  [phone]

## 2017-12-10 ENCOUNTER — OFFICE VISIT (OUTPATIENT)
Dept: URGENT CARE | Facility: RETAIL CLINIC | Age: 47
End: 2017-12-10
Payer: COMMERCIAL

## 2017-12-10 VITALS
OXYGEN SATURATION: 95 % | DIASTOLIC BLOOD PRESSURE: 87 MMHG | SYSTOLIC BLOOD PRESSURE: 133 MMHG | HEART RATE: 83 BPM | TEMPERATURE: 99.1 F

## 2017-12-10 DIAGNOSIS — J01.90 ACUTE SINUSITIS WITH COEXISTING CONDITION REQUIRING PROPHYLACTIC TREATMENT: ICD-10-CM

## 2017-12-10 DIAGNOSIS — J02.9 ACUTE PHARYNGITIS, UNSPECIFIED ETIOLOGY: Primary | ICD-10-CM

## 2017-12-10 PROCEDURE — 87880 STREP A ASSAY W/OPTIC: CPT | Mod: QW | Performed by: NURSE PRACTITIONER

## 2017-12-10 PROCEDURE — 99213 OFFICE O/P EST LOW 20 MIN: CPT | Performed by: NURSE PRACTITIONER

## 2017-12-10 PROCEDURE — 87081 CULTURE SCREEN ONLY: CPT | Performed by: NURSE PRACTITIONER

## 2017-12-10 NOTE — PROGRESS NOTES
[facility] clinic note    SUBJECTIVE:  Henrique Nelson is a 47 year old male who presents to [facility] clinic with chief complaint of sore throat.    Onset of symptoms was 1 week(s) ago.    Course of illness: gradual onset.    Severity moderate  Course of illness: Started /c a nasal drip  Current and Associated symptoms: chills, runny nose, stuffy nose, cough - non-productive, ear pain, sore throat, hoarse voice, facial pain/pressure, headache, body aches and fatigue  Treatment measures tried at home include Tylenol/Ibuprofen, Decongestants, OTC Cough med, Rest and Keflex (3 days worth TID).   Predisposing factors include None.    Current Outpatient Prescriptions   Medication     atorvastatin (LIPITOR) 80 MG tablet     atorvastatin (LIPITOR) 80 MG tablet     ASPIRIN 325 MG PO TBEC     PROAIR  (90 BASE) MCG/ACT IN AERS     [DISCONTINUED] indomethacin (INDOCIN) 50 MG capsule     nitroglycerin (NITROSTAT) 0.4 MG SL tablet     No current facility-administered medications for this visit.      PAST MEDICAL HISTORY:   Past Medical History:   Diagnosis Date     Allergic rhinitis, cause unspecified     Allergic rhinitis     CAD (coronary artery disease)     Drug eluting stent to occluded diagonal on 4/14/10     Esophageal reflux      HLD (hyperlipidaemia)      Metabolic syndrome      Mild intermittent asthma      NONSPECIFIC MEDICAL HISTORY 1997    Avulsion fracture, right ankle     Old myocardial infarction April 2010 April 2010       PAST SURGICAL HISTORY:   Past Surgical History:   Procedure Laterality Date     C NONSPECIFIC PROCEDURE  1996    Sinus surgery     CARDIAC CATHERIZATION  04/14/10     CORONARY ANGIOGRAPHY ADULT ORDER  4/14/10    4/14/10: JOELLEN to totally occluded diagonal     HC TOOTH EXTRACTION W/FORCEP      [place] teeth     STENT, CORONARY, BIJAL  4/2010       FAMILY HISTORY:   Family History   Problem Relation Age of Onset     Hypertension Maternal  Grandfather      Allergies Sister      Breast Cancer Mother      CANCER Mother      Ovarian     DIABETES Maternal Aunt      insulin       SOCIAL HISTORY:   Social History   Substance Use Topics     Smoking status: Former Smoker     Types: Cigars     Smokeless tobacco: Never Used      Comment: 04/12/2010     Alcohol use 0.0 oz/week     0 Standard drinks or equivalent per week      Comment: occasional       ROS:  Review of systems negative except as stated above.    OBJECTIVE:   Vitals:    12/10/17 1020   BP: 133/87   Pulse: 83   Temp: 99.1  F (37.3  C)   TempSrc: Oral   SpO2: 95%     GENERAL APPEARANCE: alert, active, moderate distress, cooperative and over weight  EYES: EOMI,  PERRL, conjunctiva clear  HENT: ear canals and TM's mostly normal, some Fluid.  Nose normal.  Pharynx erythematous noted.  NECK: bilateral anterior cervical adenopathy  RESP: lungs clear to auscultation - no rales, rhonchi or wheezes  CV: regular rates and rhythm, normal S1 S2, no murmur noted  ABDOMEN:  soft, nontender, no HSM or masses and bowel sounds normal  SKIN: no suspicious lesions or rashes    Rapid Strep test is negative; await throat culture results.    ASSESSMENT:   Acute pharyngitis, unspecified etiology  Acute sinusitis with coexisting condition requiring prophylactic treatment      PLAN:   Outpatient Encounter Prescriptions as of 12/10/2017   Medication Sig Dispense Refill     amoxicillin-clavulanate (AUGMENTIN) 875-125 MG per tablet Take 1 tablet by mouth 2 times daily for 10 days 20 tablet 0     atorvastatin (LIPITOR) 80 MG tablet TAKE ONE TABLET BY MOUTH EVERY DAY 90 tablet 3     atorvastatin (LIPITOR) 80 MG tablet        ASPIRIN 325 MG PO TBEC ONE DAILY  3     PROAIR  (90 BASE) MCG/ACT IN AERS INHALE 1 TO 2 PUFFS EVERY 4 TO 6 HOURS AS NEEDED 1 1 YEAR     nitroglycerin (NITROSTAT) 0.4 MG SL tablet Place 1 tablet under the tongue See Admin Instructions. for chest pain (Patient not taking: Reported on 12/10/2017) 60  tablet 3     No facility-administered encounter medications on file as of 12/10/2017.      If not improving Follow up at:  [facility] [phone]  Encourage good hydration (mainly water), may drink tea /c honey, warm chicken broth to sooth throat.  Soft foods may be preferred for several days.  Symptomatic treatment with warm Na+ H2O gargles, OTC analgesic, etc. discussed.   Strep culture pending.   Henrique Nelson told positive cultures called only.  Rest as needed.  Follow-up with primary care provider if not improving.    If difficulty breathing or swallowing be seen immediately in the ED.    Juliocesar Nunez MSN, APRN, Family NP-C  [facility]

## 2017-12-10 NOTE — MR AVS SNAPSHOT
After Visit Summary   12/10/2017    Henrique Nelson    MRN: [id number]           Patient Information     Date Of Birth          1970        Visit Information        Provider Department      12/10/2017 10:20 AM Juliocesar Nunez APRN [facility]        Today's Diagnoses     Acute pharyngitis, unspecified etiology    -  1    Acute sinusitis with coexisting condition requiring prophylactic treatment           Follow-ups after your visit        Who to contact     You can reach your care team any time of the day by calling [phone].  Notification of test results:  If you have an abnormal lab result, we will notify you by phone as soon as possible.         Additional Information About Your Visit        MyChart Information     [employer] gives you secure access to your electronic health record. If you see a primary care provider, you can also send messages to your care team and make appointments. If you have questions, please call your primary care clinic.  If you do not have a primary care provider, please call [phone] and they will assist you.        Care EveryWhere ID     This is your Care EveryWhere ID. This could be used by other organizations to access your [place] medical records  [id number]        Your Vitals Were     Pulse Temperature Pulse Oximetry             83 99.1  F (37.3  C) (Oral) 95%          Blood Pressure from Last 3 Encounters:   12/10/17 133/87   10/25/17 128/80   05/03/17 130/84    Weight from Last 3 Encounters:   05/03/17 233 lb (105.7 kg)   09/12/16 229 lb (103.9 kg)   08/22/16 228 lb (103.4 kg)              We Performed the Following     BETA STREP GROUP A R/O CULTURE     RAPID STREP SCREEN          Today's Medication Changes          These changes are accurate as of: 12/10/17 11:04 AM.  If you have any questions, ask your nurse or doctor.               Start taking these medicines.        Dose/Directions    amoxicillin-clavulanate 875-125 MG  per tablet   Commonly known as:  AUGMENTIN   Used for:  Acute sinusitis with coexisting condition requiring prophylactic treatment   Started by:  Juliocesar Nunez, ALANA CNP        Dose:  1 tablet   Take 1 tablet by mouth 2 times daily for 10 days   Quantity:  20 tablet   Refills:  0            Where to get your medicines      These medications were sent to [address]  [address], [facility] [address]     Phone:  [phone]     amoxicillin-clavulanate 875-125 MG per tablet                Primary Care Provider Office Phone # Fax #    Gregory G Schoen, [phone]       [address] [place] [address]        Equal Access to Services     LES QUIGLEY : Hadii billie dietrich hadasho Soomaali, waaxda luqadaha, qaybta kaalmada adeegyada, sunitha gonzalez . So [facility] [phone].    ATENCIÓN: Si habla español, tiene a shirley disposición servicios gratuitos de asistencia lingüística. Llame al [phone].    We comply with applicable federal civil rights laws and [place] laws. We do not discriminate on the basis of race, color, national origin, age, disability, sex, sexual orientation, or gender identity.            Thank you!     Thank you for choosing [facility]  for your care. Our goal is always to provide you with excellent care. Hearing back from our patients is one way we can continue to improve our services. Please take a few minutes to complete the written survey that you may receive in the mail after your visit with us. Thank you!             Your Updated Medication List - Protect others around you: Learn how to safely use, store and throw away your medicines at www.disposemymeds.org.          This list is accurate as of: 12/10/17 11:04 AM.  Always use your most recent med list.                   Brand Name Dispense Instructions for use Diagnosis    amoxicillin-clavulanate 875-125 MG per tablet    AUGMENTIN    20  tablet    Take 1 tablet by mouth 2 times daily for 10 days    Acute sinusitis with coexisting condition requiring prophylactic treatment       aspirin 325 MG EC tablet      ONE DAILY        * atorvastatin 80 MG tablet    LIPITOR          * atorvastatin 80 MG tablet    LIPITOR    90 tablet    TAKE ONE TABLET BY MOUTH EVERY DAY    Hyperlipidemia LDL goal <130       nitroGLYcerin 0.4 MG sublingual tablet    NITROSTAT    60 tablet    Place 1 tablet under the tongue See Admin Instructions. for chest pain    CAD (coronary artery disease)       PROAIR  (90 BASE) MCG/ACT Inhaler   Generic drug:  albuterol     1    INHALE 1 TO 2 PUFFS EVERY 4 TO 6 HOURS AS NEEDED    Unspecified asthma, with exacerbation       * Notice:  This list has 2 medication(s) that are the same as other medications prescribed for you. Read the directions carefully, and ask your doctor or other care provider to review them with you.

## 2017-12-10 NOTE — NURSING NOTE
"Chief Complaint   Patient presents with     Pharyngitis     x 7 days       Initial /87  Pulse 83  Temp 99.1  F (37.3  C) (Oral)  SpO2 95% Estimated body mass index is 33.43 kg/(m^2) as calculated from the following:    Height as of 5/3/17: 5' 10\" (1.778 m).    Weight as of 5/3/17: 233 lb (105.7 kg).  Medication Reconciliation: complete   Geeta Romano      "

## 2017-12-10 NOTE — LETTER
[facility]  [address]  [facility] [address]  Phone: [phone]    December 10, 2017        Henrique Nelson  [address] [facility] [address]          To whom it may concern:    RE: Henrique Nelson    Patient was seen and treated today at our clinic and missed work.    Please contact me for questions or concerns.      Sincerely,        ALANA Sepulveda CNP

## 2017-12-12 LAB — BETA STREP CONFIRM: NORMAL

## 2018-04-30 ENCOUNTER — HOSPITAL ENCOUNTER (OUTPATIENT)
Dept: CARDIOLOGY | Facility: CLINIC | Age: 48
Discharge: HOME | End: 2018-04-30
Attending: INTERNAL MEDICINE | Admitting: INTERNAL MEDICINE
Payer: COMMERCIAL

## 2018-04-30 DIAGNOSIS — I25.10 CORONARY ARTERY DISEASE INVOLVING NATIVE CORONARY ARTERY OF NATIVE HEART WITHOUT ANGINA PECTORIS: ICD-10-CM

## 2018-04-30 DIAGNOSIS — E78.5 HYPERLIPIDEMIA LDL GOAL <130: ICD-10-CM

## 2018-04-30 LAB
CHOLEST SERPL-MCNC: 100 MG/DL
HDLC SERPL-MCNC: 41 MG/DL
LDLC SERPL CALC-MCNC: 40 MG/DL
NONHDLC SERPL-MCNC: 59 MG/DL
TRIGL SERPL-MCNC: 94 MG/DL

## 2018-04-30 PROCEDURE — 93018 CV STRESS TEST I&R ONLY: CPT | Performed by: INTERNAL MEDICINE

## 2018-04-30 PROCEDURE — 93016 CV STRESS TEST SUPVJ ONLY: CPT | Performed by: INTERNAL MEDICINE

## 2018-04-30 PROCEDURE — 80061 LIPID PANEL: CPT | Performed by: INTERNAL MEDICINE

## 2018-04-30 PROCEDURE — 93325 DOPPLER ECHO COLOR FLOW MAPG: CPT | Mod: TC

## 2018-04-30 PROCEDURE — 93325 DOPPLER ECHO COLOR FLOW MAPG: CPT | Mod: 26 | Performed by: INTERNAL MEDICINE

## 2018-04-30 PROCEDURE — 25500064 ZZH RX 255 OP 636: Performed by: INTERNAL MEDICINE

## 2018-04-30 PROCEDURE — 93350 STRESS TTE ONLY: CPT | Mod: 26 | Performed by: INTERNAL MEDICINE

## 2018-04-30 PROCEDURE — 93321 DOPPLER ECHO F-UP/LMTD STD: CPT | Mod: 26 | Performed by: INTERNAL MEDICINE

## 2018-04-30 PROCEDURE — 93017 CV STRESS TEST TRACING ONLY: CPT | Performed by: INTERNAL MEDICINE

## 2018-04-30 PROCEDURE — 36415 COLL VENOUS BLD VENIPUNCTURE: CPT | Performed by: INTERNAL MEDICINE

## 2018-04-30 RX ADMIN — HUMAN ALBUMIN MICROSPHERES AND PERFLUTREN 3 ML: 10; .22 INJECTION, SOLUTION INTRAVENOUS at 09:24

## 2018-05-14 ENCOUNTER — OFFICE VISIT (OUTPATIENT)
Dept: FAMILY MEDICINE | Facility: CLINIC | Age: 48
End: 2018-05-14
Payer: COMMERCIAL

## 2018-05-14 VITALS
HEIGHT: 70 IN | SYSTOLIC BLOOD PRESSURE: 110 MMHG | WEIGHT: 228 LBS | BODY MASS INDEX: 32.64 KG/M2 | OXYGEN SATURATION: 97 % | HEART RATE: 100 BPM | TEMPERATURE: 95.5 F | DIASTOLIC BLOOD PRESSURE: 80 MMHG

## 2018-05-14 DIAGNOSIS — J20.9 ACUTE BRONCHITIS, UNSPECIFIED ORGANISM: Primary | ICD-10-CM

## 2018-05-14 DIAGNOSIS — I25.10 CORONARY ARTERY DISEASE INVOLVING NATIVE CORONARY ARTERY OF NATIVE HEART WITHOUT ANGINA PECTORIS: ICD-10-CM

## 2018-05-14 DIAGNOSIS — Z95.5 STATUS POST INSERTION OF DRUG ELUTING CORONARY ARTERY STENT: ICD-10-CM

## 2018-05-14 PROCEDURE — 99214 OFFICE O/P EST MOD 30 MIN: CPT | Performed by: FAMILY MEDICINE

## 2018-05-14 RX ORDER — AZITHROMYCIN 250 MG/1
TABLET, FILM COATED ORAL
Qty: 6 TABLET | Refills: 0 | Status: SHIPPED | OUTPATIENT
Start: 2018-05-14 | End: 2018-06-28

## 2018-05-14 RX ORDER — ALBUTEROL SULFATE 90 UG/1
2 AEROSOL, METERED RESPIRATORY (INHALATION) EVERY 6 HOURS PRN
Qty: 1 INHALER | Refills: 3 | Status: SHIPPED | OUTPATIENT
Start: 2018-05-14 | End: 2018-11-29

## 2018-05-14 RX ORDER — CODEINE PHOSPHATE AND GUAIFENESIN 10; 100 MG/5ML; MG/5ML
1-2 SOLUTION ORAL EVERY 6 HOURS PRN
Qty: 240 ML | Refills: 0 | Status: SHIPPED | OUTPATIENT
Start: 2018-05-14 | End: 2018-06-28

## 2018-05-14 NOTE — MR AVS SNAPSHOT
After Visit Summary   5/14/2018    Henrique Nelson    MRN: [id number]           Patient Information     Date Of Birth          1970        Visit Information        Provider Department      5/14/2018 1:40 PM Gurpreet Washington MD [facility]        Today's Diagnoses     Acute bronchitis, unspecified organism    -  1    Coronary artery disease involving native coronary artery of native heart without angina pectoris        Status post insertion of drug eluting coronary artery stent           Follow-ups after your visit        Your next 10 appointments already scheduled     Jun 28, 2018  3:15 PM CDT   Return Visit with Silvio Michael MD   [facility]    [facility]  [facility] [address] [phone] OPT [address]              Who to contact     If you have questions or need follow up information about today's clinic visit or your schedule please contact [facility] directly at [phone].  Normal or non-critical lab and imaging results will be communicated to you by [employer], letter or phone within 4 business days after the clinic has received the results. If you do not hear from us within 7 days, please contact the clinic through [employer] or phone. If you have a critical or abnormal lab result, we will notify you by phone as soon as possible.  Submit refill requests through [employer] or call your pharmacy and they will forward the refill request to us. Please allow 3 business days for your refill to be completed.          Additional Information About Your Visit        [employer] Information     [employer] gives you secure access to your electronic health record. If you see a primary care provider, you can also send messages to your care team and make appointments. If you have questions, please call your primary care clinic.  If you do not have a primary care provider, please call [phone] and they  "will assist you.        Care EveryWhere ID     This is your Care EveryWhere ID. This could be used by other organizations to access your [place] medical records  [id number]        Your Vitals Were     Pulse Temperature Height Pulse Oximetry BMI (Body Mass Index)       100 95.5  F (35.3  C) (Temporal) 5' 10\" (1.778 m) 97% 32.71 kg/m2        Blood Pressure from Last 3 Encounters:   05/14/18 110/80   12/10/17 133/87   10/25/17 128/80    Weight from Last 3 Encounters:   05/14/18 228 lb (103.4 kg)   05/03/17 233 lb (105.7 kg)   09/12/16 229 lb (103.9 kg)              Today, you had the following     No orders found for display         Today's Medication Changes          These changes are accurate as of 5/14/18  2:45 PM.  If you have any questions, ask your nurse or doctor.               Start taking these medicines.        Dose/Directions    azithromycin 250 MG tablet   Commonly known as:  ZITHROMAX   Used for:  Acute bronchitis, unspecified organism   Started by:  Gurpreet Washington MD        Two tablets first day, then one tablet daily for four days.   Quantity:  6 tablet   Refills:  0       guaiFENesin-codeine 100-10 MG/5ML Soln solution   Commonly known as:  ROBITUSSIN AC   Used for:  Acute bronchitis, unspecified organism   Started by:  Gurpreet Washington MD        Dose:  1-2 tsp.   Take 5-10 mLs by mouth every 6 hours as needed   Quantity:  240 mL   Refills:  0         These medicines have changed or have updated prescriptions.        Dose/Directions    * PROAIR  (90 Base) MCG/ACT Inhaler   This may have changed:  Another medication with the same name was added. Make sure you understand how and when to take each.   Used for:  Unspecified asthma, with exacerbation   Generic drug:  albuterol   Changed by:  Gurpreet Washington MD        INHALE 1 TO 2 PUFFS EVERY 4 TO 6 HOURS AS NEEDED   Quantity:  1   Refills:  1 YEAR       * albuterol 108 (90 Base) MCG/ACT Inhaler   Commonly known as:  PROAIR HFA/PROVENTIL " HFA/VENTOLIN HFA   This may have changed:  You were already taking a medication with the same name, and this prescription was added. Make sure you understand how and when to take each.   Used for:  Acute bronchitis, unspecified organism   Changed by:  Gurpreet Washington MD        Dose:  2 puff   Inhale 2 puffs into the lungs every 6 hours as needed for shortness of breath / dyspnea or wheezing   Quantity:  1 Inhaler   Refills:  3       * Notice:  This list has 2 medication(s) that are the same as other medications prescribed for you. Read the directions carefully, and ask your doctor or other care provider to review them with you.         Where to get your medicines      These medications were sent to [facility] [place] - [address]   [address] , [facility] [address]     Phone:  [phone]     albuterol 108 (90 Base) MCG/ACT Inhaler    azithromycin 250 MG tablet         Some of these will need a paper prescription and others can be bought over the counter.  Ask your nurse if you have questions.     Bring a paper prescription for each of these medications     guaiFENesin-codeine 100-10 MG/5ML Soln solution                Primary Care Provider Office Phone # Fax #    Gregory G Schoen, [phone] [address]  [facility] [address]        Equal Access to Services     [facility] AH: Hadii billie ku hadasho Soomaali, waaxda luqadaha, qaybta kaalmada adeegyada, waxay idiin hayneerajn guillermo kyle. So [facility] [phone].    ATENCIÓN: Si habla español, tiene a shirley disposición servicios gratuitos de asistencia lingüística. Llame al [phone].    We comply with applicable federal civil rights laws and [place] laws. We do not discriminate on the basis of race, color, national origin, age, disability, sex, sexual orientation, or gender identity.            Thank you!     Thank you for choosing [facility]  for your care. Our goal is always to  provide you with excellent care. Hearing back from our patients is one way we can continue to improve our services. Please take a few minutes to complete the written survey that you may receive in the mail after your visit with us. Thank you!             Your Updated Medication List - Protect others around you: Learn how to safely use, store and throw away your medicines at www.disposemymeds.org.          This list is accurate as of 5/14/18  2:45 PM.  Always use your most recent med list.                   Brand Name Dispense Instructions for use Diagnosis    aspirin 325 MG EC tablet      ONE DAILY        atorvastatin 80 MG tablet    LIPITOR    90 tablet    TAKE ONE TABLET BY MOUTH EVERY DAY    Hyperlipidemia LDL goal <130       azithromycin 250 MG tablet    ZITHROMAX    6 tablet    Two tablets first day, then one tablet daily for four days.    Acute bronchitis, unspecified organism       guaiFENesin-codeine 100-10 MG/5ML Soln solution    ROBITUSSIN AC    240 mL    Take 5-10 mLs by mouth every 6 hours as needed    Acute bronchitis, unspecified organism       nitroGLYcerin 0.4 MG sublingual tablet    NITROSTAT    60 tablet    Place 1 tablet under the tongue See Admin Instructions. for chest pain    CAD (coronary artery disease)       * PROAIR  (90 Base) MCG/ACT Inhaler   Generic drug:  albuterol     1    INHALE 1 TO 2 PUFFS EVERY 4 TO 6 HOURS AS NEEDED    Unspecified asthma, with exacerbation       * albuterol 108 (90 Base) MCG/ACT Inhaler    PROAIR HFA/PROVENTIL HFA/VENTOLIN HFA    1 Inhaler    Inhale 2 puffs into the lungs every 6 hours as needed for shortness of breath / dyspnea or wheezing    Acute bronchitis, unspecified organism       * Notice:  This list has 2 medication(s) that are the same as other medications prescribed for you. Read the directions carefully, and ask your doctor or other care provider to review them with you.

## 2018-05-14 NOTE — LETTER
[address] [address]  Phone: [phone]  Fax: [phone]    May 14, 2018        Henrique Nelson  [address] [place] [address]          To whom it may concern:    RE: Henrique Nelson    Patient was seen and treated today at our clinic. Please excuse from work yesterday and today.    Please contact me for questions or concerns.      Sincerely,        Gurpreet Washington MD

## 2018-05-14 NOTE — PROGRESS NOTES
SUBJECTIVE:   Henrique Nelson is a 47 year old male who presents to clinic today for the following health issues:      Acute Illness   Acute illness concerns:Cough and sore throat  Onset: 6 days ago     Fever: no    Chills/Sweats: YES- sweats     Headache (location?): YES- Sinuses     Sinus Pressure:YES    Conjunctivitis:  no    Ear Pain: no    Rhinorrhea: YES    Congestion: YES- chest and face     Sore Throat: YES. PND.      Cough: YES-non-productive and productive     Wheeze: no    Decreased Appetite: YES    Nausea: no    Vomiting: no    Diarrhea:  no    Dysuria/Freq.: no    Fatigue/Achiness: YES- both     Sick/Strep Exposure: no     Therapies Tried and outcome: Nebulizer at home and an inhaler. OTC antihistamine and cough syrup.           Problem list and histories reviewed & adjusted, as indicated.  Additional history: as documented        Reviewed and updated as needed this visit by clinical staff       Reviewed and updated as needed this visit by Provider        SUBJECTIVE:  Henrique  is a 47 year old male who presents for: Symptoms as noted above.  Is missed several days of work.  History of coronary artery disease and stenting.  Has been taking some antihistamines but no decongestants.    Past Medical History:   Diagnosis Date     Allergic rhinitis, cause unspecified     Allergic rhinitis     CAD (coronary artery disease)     Drug eluting stent to occluded diagonal on 4/14/10     Esophageal reflux      HLD (hyperlipidaemia)      Metabolic syndrome      Mild intermittent asthma      NONSPECIFIC MEDICAL HISTORY 1997    Avulsion fracture, right ankle     Old myocardial infarction April 2010 April 2010     Past Surgical History:   Procedure Laterality Date     C NONSPECIFIC PROCEDURE  1996    Sinus surgery     CARDIAC CATHERIZATION  04/14/10     CORONARY ANGIOGRAPHY ADULT ORDER  4/14/10    4/14/10: JOELLEN to totally occluded diagonal     HC TOOTH EXTRACTION W/FORCEP      [place] teeth     STENT, CORONARY, BIJAL   "4/2010     Social History   Substance Use Topics     Smoking status: Former Smoker     Types: Cigars     Smokeless tobacco: Never Used      Comment: 04/12/2010     Alcohol use 0.0 oz/week     0 Standard drinks or equivalent per week      Comment: occasional     Current Outpatient Prescriptions   Medication Sig Dispense Refill     albuterol (PROAIR HFA/PROVENTIL HFA/VENTOLIN HFA) 108 (90 Base) MCG/ACT Inhaler Inhale 2 puffs into the lungs every 6 hours as needed for shortness of breath / dyspnea or wheezing 1 Inhaler 3     ASPIRIN 325 MG PO TBEC ONE DAILY  3     atorvastatin (LIPITOR) 80 MG tablet TAKE ONE TABLET BY MOUTH EVERY DAY 90 tablet 3     azithromycin (ZITHROMAX) 250 MG tablet Two tablets first day, then one tablet daily for four days. 6 tablet 0     guaiFENesin-codeine (ROBITUSSIN AC) 100-10 MG/5ML SOLN solution Take 5-10 mLs by mouth every 6 hours as needed 240 mL 0     nitroglycerin (NITROSTAT) 0.4 MG SL tablet Place 1 tablet under the tongue See Admin Instructions. for chest pain 60 tablet 3     PROAIR  (90 BASE) MCG/ACT IN AERS INHALE 1 TO 2 PUFFS EVERY 4 TO 6 HOURS AS NEEDED 1 1 YEAR     [DISCONTINUED] atorvastatin (LIPITOR) 80 MG tablet        [DISCONTINUED] indomethacin (INDOCIN) 50 MG capsule Take 1 capsule (50 mg) by mouth 3 times daily (with meals) for 10 days 30 capsule 0       REVIEW OF SYSTEMS:   5 point ROS negative except as noted above in HPI, including Gen., Resp, CV, GI &  system review.     OBJECTIVE:  Vitals: /80 (BP Location: Left arm, Patient Position: Chair, Cuff Size: Adult Large)  Pulse 100  Temp 95.5  F (35.3  C) (Temporal)  Ht 5' 10\" (1.778 m)  Wt 228 lb (103.4 kg)  SpO2 97%  BMI 32.71 kg/m2  BMI= Body mass index is 32.71 kg/(m^2).  He is alert appears slightly uncomfortable.  Eyes are okay.  Nose is congested.  Throat a little bit of drainage little reddened on the tonsillar pillars.  Ears with some serous fluid.  Neck supple no adenopathy is tender.  Lungs a " few rhonchi heart regular rhythm.    ASSESSMENT:  Bronchitis  #2 history of myocardial infarction and stenting.    PLAN:  Z-Quirino.  Ventolin inhaler.  Robitussin with codeine.  Can continue on antihistamines as he thinks some of this may have started with being allergic to pollen.  Because of his heart condition we recommend no decongestants.  Told these medications will be fine.  Follow-up if not improving.        Gurpreet Washington MD  [facility]

## 2018-06-05 DIAGNOSIS — E78.5 HYPERLIPIDEMIA LDL GOAL <130: Primary | ICD-10-CM

## 2018-06-05 NOTE — PROGRESS NOTES
Notes Recorded by Silvio Michael MD on 6/5/2018 at 8:18 AM  Excellent lipid control.  No change in medications.  Would repeat in two years.  Silvio Michael MD, [facility]  April 30, 2018 10:22 PM    Pt updated via [employer] 5/1/18. FLP order placed.

## 2018-06-28 ENCOUNTER — OFFICE VISIT (OUTPATIENT)
Dept: CARDIOLOGY | Facility: CLINIC | Age: 48
End: 2018-06-28
Attending: INTERNAL MEDICINE
Payer: COMMERCIAL

## 2018-06-28 VITALS
HEIGHT: 70 IN | BODY MASS INDEX: 32.35 KG/M2 | SYSTOLIC BLOOD PRESSURE: 126 MMHG | DIASTOLIC BLOOD PRESSURE: 82 MMHG | WEIGHT: 226 LBS | HEART RATE: 72 BPM

## 2018-06-28 DIAGNOSIS — E88.810 METABOLIC SYNDROME: ICD-10-CM

## 2018-06-28 DIAGNOSIS — Z95.5 STATUS POST INSERTION OF DRUG ELUTING CORONARY ARTERY STENT: ICD-10-CM

## 2018-06-28 DIAGNOSIS — E78.5 HYPERLIPIDEMIA LDL GOAL <130: ICD-10-CM

## 2018-06-28 DIAGNOSIS — I25.10 CORONARY ARTERY DISEASE INVOLVING NATIVE CORONARY ARTERY OF NATIVE HEART WITHOUT ANGINA PECTORIS: Primary | ICD-10-CM

## 2018-06-28 PROCEDURE — 99214 OFFICE O/P EST MOD 30 MIN: CPT | Performed by: INTERNAL MEDICINE

## 2018-06-28 NOTE — LETTER
6/28/2018      Gregory G. Schoen, MD  [address] [place] [address]      RE: Henrique Nelson       Dear Colleague,    I had the pleasure of seeing Henrique Nelson in the [facility].    Service Date: 06/28/2018      PRIMARY CARE PHYSICIAN:  Dr. Gregory Schoen.      HISTORY OF PRESENT ILLNESS:  I again had the pleasure of seeing your patient, Henrique Nelson, at [facility] for evaluation of coronary artery disease and hyperlipidemia.  The patient is status post subendocardial myocardial infarction in 04/2010 with a totally occluded diagonal branch artery.  This was stented using a drug-eluting stent and the patient has not had recurrent angina pectoris.  His last stress echocardiogram was performed on 04/30 demonstrating no areas of ischemia or infarction.  Ejection fraction was well maintained.  His most recent lipid profile 04/30/2018 showed total cholesterol 100, HDL 41, LDL 40 and triglycerides 94.  The patient denies myalgias or myopathy.  He is not exercising aerobically and we again discussed the need for every other day 30 minutes of aerobic exercise.  He smokes an occasional cigar and we discussed the need given his asthma and coronary artery disease to avoid smoking cigars or cigarettes.  His weight has stabilized and he is eating much less fast food and high-caloric food.  He has reduced his meat consumption and increased his pork, fish and chicken.  The patient denies hospitalizations or surgery since I saw him 1 year ago.  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  He denies palpitations, syncope, presyncope, or peripheral edema.      PHYSICAL EXAMINATION:  As listed below.      ASSESSMENT:   1.  Henrique Nelson is a delightful 48-year-old male status post diagonal branch artery myocardial infarction and intracoronary stenting in 04/2010.  His recent stress echocardiogram was normal.  The patient remains on  aspirin and atorvastatin life-long.  His blood pressure is well controlled.  I again spent 30 minutes with the patient, greater than 50% of that time was counseling on diet, exercise and weight loss.   2.  The patient remains on high-dose atorvastatin with excellent lipid profile.  We will continue to monitor and repeat this in 2 years.  He denies myalgias or myopathy.      It is my pleasure to assist in the care of Henrique Casas.  All his questions were answered to his satisfaction.      Vida Wright MD      cc:   Gregory Schoen, MD    [address]  [address]         VIDA WRIGHT MD, [facility]             D: 2018   T: 2018   MT: JADA      Name:     HENRIQUE CASAS   MRN:      [id number]        Account:      [id number]   :      1970           Service Date: 2018      Document: [id number]         Outpatient Encounter Prescriptions as of 2018   Medication Sig Dispense Refill     albuterol (PROAIR HFA/PROVENTIL HFA/VENTOLIN HFA) 108 (90 Base) MCG/ACT Inhaler Inhale 2 puffs into the lungs every 6 hours as needed for shortness of breath / dyspnea or wheezing 1 Inhaler 3     ASPIRIN 325 MG PO TBEC ONE DAILY  3     atorvastatin (LIPITOR) 80 MG tablet TAKE ONE TABLET BY MOUTH EVERY DAY 90 tablet 3     nitroglycerin (NITROSTAT) 0.4 MG SL tablet Place 1 tablet under the tongue See Admin Instructions. for chest pain 60 tablet 3     [DISCONTINUED] azithromycin (ZITHROMAX) 250 MG tablet Two tablets first day, then one tablet daily for four days. (Patient not taking: Reported on 2018) 6 tablet 0     [DISCONTINUED] guaiFENesin-codeine (ROBITUSSIN AC) 100-10 MG/5ML SOLN solution Take 5-10 mLs by mouth every 6 hours as needed (Patient not taking: Reported on 2018) 240 mL 0     [DISCONTINUED] PROAIR  (90 BASE) MCG/ACT IN AERS INHALE 1 TO 2 PUFFS EVERY 4 TO 6 HOURS AS NEEDED 1 1 YEAR     No facility-administered encounter  medications on file as of 6/28/2018.        Again, thank you for allowing me to participate in the care of your patient.      Sincerely,    Silvio Michael MD     [facility]

## 2018-06-28 NOTE — LETTER
6/28/2018    Gregory G. Schoen, MD  [address] [place] [address]    RE: Henrique Nelson       Dear Colleague,    I had the pleasure of seeing Henrique Nelson in the [facility].    HPI and Plan:   See dictation:[id number]    Orders Placed This Encounter   Procedures     Follow-Up with Cardiologist       No orders of the defined types were placed in this encounter.      Medications Discontinued During This Encounter   Medication Reason     azithromycin (ZITHROMAX) 250 MG tablet      PROAIR  (90 BASE) MCG/ACT IN AERS      guaiFENesin-codeine (ROBITUSSIN AC) 100-10 MG/5ML SOLN solution          Encounter Diagnoses   Name Primary?     Coronary artery disease involving native coronary artery of native heart without angina pectoris      Status post insertion of drug eluting coronary artery stent Yes     Hyperlipidemia LDL goal <130      Metabolic syndrome        CURRENT MEDICATIONS:  Current Outpatient Prescriptions   Medication Sig Dispense Refill     albuterol (PROAIR HFA/PROVENTIL HFA/VENTOLIN HFA) 108 (90 Base) MCG/ACT Inhaler Inhale 2 puffs into the lungs every 6 hours as needed for shortness of breath / dyspnea or wheezing 1 Inhaler 3     ASPIRIN 325 MG PO TBEC ONE DAILY  3     atorvastatin (LIPITOR) 80 MG tablet TAKE ONE TABLET BY MOUTH EVERY DAY 90 tablet 3     nitroglycerin (NITROSTAT) 0.4 MG SL tablet Place 1 tablet under the tongue See Admin Instructions. for chest pain 60 tablet 3     [DISCONTINUED] indomethacin (INDOCIN) 50 MG capsule Take 1 capsule (50 mg) by mouth 3 times daily (with meals) for 10 days 30 capsule 0     [DISCONTINUED] PROAIR  (90 BASE) MCG/ACT IN AERS INHALE 1 TO 2 PUFFS EVERY 4 TO 6 HOURS AS NEEDED 1 1 YEAR       ALLERGIES     Allergies   Allergen Reactions     Cat Hair [Cats]      No Known Drug Allergies        PAST MEDICAL HISTORY:  Past Medical History:   Diagnosis Date     Allergic rhinitis, cause unspecified     Allergic rhinitis      CAD (coronary artery disease)     Drug eluting stent to occluded diagonal on 4/14/10     Esophageal reflux      HLD (hyperlipidaemia)      Metabolic syndrome      Mild intermittent asthma      NONSPECIFIC MEDICAL HISTORY 1997    Avulsion fracture, right ankle     Old myocardial infarction April 2010 April 2010       PAST SURGICAL HISTORY:  Past Surgical History:   Procedure Laterality Date     C NONSPECIFIC PROCEDURE  1996    Sinus surgery     CARDIAC CATHERIZATION  04/14/10     CORONARY ANGIOGRAPHY ADULT ORDER  4/14/10    4/14/10: JOELLEN to totally occluded diagonal     HC TOOTH EXTRACTION W/FORCEP      [place] teeth     STENT, CORONARY, BIJAL  4/2010       FAMILY HISTORY:  Family History   Problem Relation Age of Onset     Hypertension Maternal Grandfather      Allergies Sister      Breast Cancer Mother      Cancer Mother      Ovarian     Diabetes Maternal Aunt      insulin       SOCIAL HISTORY:  Social History     Social History     Marital status:      Spouse name: Alberta     Number of children: 0     Years of education: N/A     Occupational History     Shop work      Social History Main Topics     Smoking status: Former Smoker     Types: Cigars     Quit date: 4/28/2010     Smokeless tobacco: Never Used      Comment: occasional cigar     Alcohol use 0.0 oz/week     0 Standard drinks or equivalent per week      Comment: occasional     Drug use: No     Sexual activity: Yes     Partners: Female     Other Topics Concern     Parent/Sibling W/ Cabg, Mi Or Angioplasty Before 65f 55m? No     Caffeine Concern No     dt. mountain dew- 2 a day (16oz)     Exercise No     Seat Belt Yes     Social History Narrative       Review of Systems:  Skin:  Negative       Eyes:  Positive for glasses    ENT:  Negative      Respiratory:  Positive for  (Asthma)     Cardiovascular:  Negative      Gastroenterology: Negative      Genitourinary:  not assessed      Musculoskeletal:  Negative      Neurologic:  Negative      Psychiatric:   "Negative      Heme/Lymph/Imm:  Positive for allergies    Endocrine:  Negative        Physical Exam:  Vitals: /82  Pulse 72  Ht 1.778 m (5' 10\")  Wt 102.5 kg (226 lb)  BMI 32.43 kg/m2    Constitutional:  cooperative, alert and oriented, well developed, well nourished, in no acute distress overweight      Skin:  warm and dry to the touch, no apparent skin lesions or masses noted          Head:  normocephalic, no masses or lesions        Eyes:  pupils equal and round;conjunctivae and lids unremarkable;sclera white;no xanthalasma;EOMS intact        Lymph:      ENT:  no pallor or cyanosis, dentition good        Neck:  carotid pulses are full and equal bilaterally, JVP normal, no carotid bruit        Respiratory:  normal breath sounds, clear to auscultation, normal A-P diameter, normal symmetry, normal respiratory excursion, no use of accessory muscles         Cardiac: regular rhythm, normal S1/S2, no S3 or S4, apical impulse not displaced, no murmurs, gallops or rubs                pulses full and equal, no bruits auscultated                                        GI:  abdomen soft, non-tender, BS normoactive, no mass, no HSM, no bruits        Extremities and Muscular Skeletal:  no deformities, clubbing, cyanosis, erythema observed;no edema              Neurological:  no gross motor deficits;affect appropriate        Psych:  Alert and Oriented x 3        CC  Silvio Michael MD  [address] [address]                Thank you for allowing me to participate in the care of your patient.      Sincerely,     Silvio Michael MD     [facility]    cc:   Silvio Michael MD  [address]  [place] [address]        "

## 2018-06-28 NOTE — PROGRESS NOTES
HPI and Plan:   See dictation:[id number]    Orders Placed This Encounter   Procedures     Follow-Up with Cardiologist       No orders of the defined types were placed in this encounter.      Medications Discontinued During This Encounter   Medication Reason     azithromycin (ZITHROMAX) 250 MG tablet      PROAIR  (90 BASE) MCG/ACT IN AERS      guaiFENesin-codeine (ROBITUSSIN AC) 100-10 MG/5ML SOLN solution          Encounter Diagnoses   Name Primary?     Coronary artery disease involving native coronary artery of native heart without angina pectoris      Status post insertion of drug eluting coronary artery stent Yes     Hyperlipidemia LDL goal <130      Metabolic syndrome        CURRENT MEDICATIONS:  Current Outpatient Prescriptions   Medication Sig Dispense Refill     albuterol (PROAIR HFA/PROVENTIL HFA/VENTOLIN HFA) 108 (90 Base) MCG/ACT Inhaler Inhale 2 puffs into the lungs every 6 hours as needed for shortness of breath / dyspnea or wheezing 1 Inhaler 3     ASPIRIN 325 MG PO TBEC ONE DAILY  3     atorvastatin (LIPITOR) 80 MG tablet TAKE ONE TABLET BY MOUTH EVERY DAY 90 tablet 3     nitroglycerin (NITROSTAT) 0.4 MG SL tablet Place 1 tablet under the tongue See Admin Instructions. for chest pain 60 tablet 3     [DISCONTINUED] indomethacin (INDOCIN) 50 MG capsule Take 1 capsule (50 mg) by mouth 3 times daily (with meals) for 10 days 30 capsule 0     [DISCONTINUED] PROAIR  (90 BASE) MCG/ACT IN AERS INHALE 1 TO 2 PUFFS EVERY 4 TO 6 HOURS AS NEEDED 1 1 YEAR       ALLERGIES     Allergies   Allergen Reactions     Cat Hair [Cats]      No Known Drug Allergies        PAST MEDICAL HISTORY:  Past Medical History:   Diagnosis Date     Allergic rhinitis, cause unspecified     Allergic rhinitis     CAD (coronary artery disease)     Drug eluting stent to occluded diagonal on 4/14/10     Esophageal reflux      HLD (hyperlipidaemia)      Metabolic syndrome      Mild intermittent asthma      NONSPECIFIC MEDICAL HISTORY  "1997    Avulsion fracture, right ankle     Old myocardial infarction April 2010 April 2010       PAST SURGICAL HISTORY:  Past Surgical History:   Procedure Laterality Date     C NONSPECIFIC PROCEDURE  1996    Sinus surgery     CARDIAC CATHERIZATION  04/14/10     CORONARY ANGIOGRAPHY ADULT ORDER  4/14/10    4/14/10: JOELLEN to totally occluded diagonal     HC TOOTH EXTRACTION W/FORCEP      [place] teeth     STENT, CORONARY, BIJAL  4/2010       FAMILY HISTORY:  Family History   Problem Relation Age of Onset     Hypertension Maternal Grandfather      Allergies Sister      Breast Cancer Mother      Cancer Mother      Ovarian     Diabetes Maternal Aunt      insulin       SOCIAL HISTORY:  Social History     Social History     Marital status:      Spouse name: Alberta     Number of children: 0     Years of education: N/A     Occupational History     Shop work      Social History Main Topics     Smoking status: Former Smoker     Types: Cigars     Quit date: 4/28/2010     Smokeless tobacco: Never Used      Comment: occasional cigar     Alcohol use 0.0 oz/week     0 Standard drinks or equivalent per week      Comment: occasional     Drug use: No     Sexual activity: Yes     Partners: Female     Other Topics Concern     Parent/Sibling W/ Cabg, Mi Or Angioplasty Before 65f 55m? No     Caffeine Concern No     dt. mountain dew- 2 a day (16oz)     Exercise No     Seat Belt Yes     Social History Narrative       Review of Systems:  Skin:  Negative       Eyes:  Positive for glasses    ENT:  Negative      Respiratory:  Positive for  (Asthma)     Cardiovascular:  Negative      Gastroenterology: Negative      Genitourinary:  not assessed      Musculoskeletal:  Negative      Neurologic:  Negative      Psychiatric:  Negative      Heme/Lymph/Imm:  Positive for allergies    Endocrine:  Negative        Physical Exam:  Vitals: /82  Pulse 72  Ht 1.778 m (5' 10\")  Wt 102.5 kg (226 lb)  BMI 32.43 kg/m2    Constitutional:  cooperative, " alert and oriented, well developed, well nourished, in no acute distress overweight      Skin:  warm and dry to the touch, no apparent skin lesions or masses noted          Head:  normocephalic, no masses or lesions        Eyes:  pupils equal and round;conjunctivae and lids unremarkable;sclera white;no xanthalasma;EOMS intact        Lymph:      ENT:  no pallor or cyanosis, dentition good        Neck:  carotid pulses are full and equal bilaterally, JVP normal, no carotid bruit        Respiratory:  normal breath sounds, clear to auscultation, normal A-P diameter, normal symmetry, normal respiratory excursion, no use of accessory muscles         Cardiac: regular rhythm, normal S1/S2, no S3 or S4, apical impulse not displaced, no murmurs, gallops or rubs                pulses full and equal, no bruits auscultated                                        GI:  abdomen soft, non-tender, BS normoactive, no mass, no HSM, no bruits        Extremities and Muscular Skeletal:  no deformities, clubbing, cyanosis, erythema observed;no edema              Neurological:  no gross motor deficits;affect appropriate        Psych:  Alert and Oriented x 3        CC  Silvio Michael MD  [address]  [place] [address]

## 2018-06-28 NOTE — MR AVS SNAPSHOT
After Visit Summary   6/28/2018    Henrique Nelson    MRN: [id number]           Patient Information     Date Of Birth          1970        Visit Information        Provider Department      6/28/2018 3:15 PM Silvio Michael MD [facility]   [place]        Today's Diagnoses     Status post insertion of drug eluting coronary artery stent    -  1    Coronary artery disease involving native coronary artery of native heart without angina pectoris        Hyperlipidemia LDL goal <130        Metabolic syndrome           Follow-ups after your visit        Additional Services     Follow-Up with Cardiologist                 Future tests that were ordered for you today     Open Future Orders        Priority Expected Expires Ordered    Follow-Up with Cardiologist Routine 6/28/2019 6/29/2019 6/28/2018            Who to contact     If you have questions or need follow up information about today's clinic visit or your schedule please contact [facility]   [place] directly at [phone].  Normal or non-critical lab and imaging results will be communicated to you by Lessnohart, letter or phone within 4 business days after the clinic has received the results. If you do not hear from us within 7 days, please contact the clinic through Lessnohart or phone. If you have a critical or abnormal lab result, we will notify you by phone as soon as possible.  Submit refill requests through [email or web address] or call your pharmacy and they will forward the refill request to us. Please allow 3 business days for your refill to be completed.          Additional Information About Your Visit        MyChart Information     [email or web address] gives you secure access to your electronic health record. If you see a primary care provider, you can also send messages to your care team and make appointments. If you have questions, please call your primary care clinic.  If you do not have a primary care  "provider, please call [phone] and they will assist you.        Care EveryWhere ID     This is your Care EveryWhere ID. This could be used by other organizations to access your [facility] medical records  [id number]        Your Vitals Were     Pulse Height BMI (Body Mass Index)             72 1.778 m (5' 10\") 32.43 kg/m2          Blood Pressure from Last 3 Encounters:   06/28/18 126/82   05/14/18 110/80   12/10/17 133/87    Weight from Last 3 Encounters:   06/28/18 102.5 kg (226 lb)   05/14/18 103.4 kg (228 lb)   05/03/17 105.7 kg (233 lb)              We Performed the Following     Follow-Up with Cardiologist          Today's Medication Changes          These changes are accurate as of 6/28/18  3:39 PM.  If you have any questions, ask your nurse or doctor.               These medicines have changed or have updated prescriptions.        Dose/Directions    albuterol 108 (90 Base) MCG/ACT Inhaler   Commonly known as:  PROAIR HFA/PROVENTIL HFA/VENTOLIN HFA   This may have changed:  Another medication with the same name was removed. Continue taking this medication, and follow the directions you see here.   Used for:  Acute bronchitis, unspecified organism   Changed by:  Silvio Michael MD        Dose:  2 puff   Inhale 2 puffs into the lungs every 6 hours as needed for shortness of breath / dyspnea or wheezing   Quantity:  1 Inhaler   Refills:  3         Stop taking these medicines if you haven't already. Please contact your care team if you have questions.     azithromycin 250 MG tablet   Commonly known as:  ZITHROMAX   Stopped by:  Silvio Michael MD           guaiFENesin-codeine 100-10 MG/5ML Soln solution   Commonly known as:  ROBITUSSIN AC   Stopped by:  Silvio Michael MD                    Primary Care Provider Office Phone # Fax #    Gregory G Schoen, [phone]       [address] [place] [address]        Equal Access to Services     DAFNE QUIGLEY AH: Poncho perdomo " Salo, stefano luarcenioadaha, papa karagini cortez, sunitha lizama catalinobrenna niyaalmaz lagilsav anabella. So [facility] [phone].    ATENCIÓN: Si lea magana, tiene a shirley disposición servicios gratuitos de asistencia lingüística. Sri al [phone].    We comply with applicable federal civil rights laws and [place] laws. We do not discriminate on the basis of race, color, national origin, age, disability, sex, sexual orientation, or gender identity.            Thank you!     Thank you for choosing [facility]  for your care. Our goal is always to provide you with excellent care. Hearing back from our patients is one way we can continue to improve our services. Please take a few minutes to complete the written survey that you may receive in the mail after your visit with us. Thank you!             Your Updated Medication List - Protect others around you: Learn how to safely use, store and throw away your medicines at www.disposemymeds.org.          This list is accurate as of 6/28/18  3:39 PM.  Always use your most recent med list.                   Brand Name Dispense Instructions for use Diagnosis    albuterol 108 (90 Base) MCG/ACT Inhaler    PROAIR HFA/PROVENTIL HFA/VENTOLIN HFA    1 Inhaler    Inhale 2 puffs into the lungs every 6 hours as needed for shortness of breath / dyspnea or wheezing    Acute bronchitis, unspecified organism       aspirin 325 MG EC tablet      ONE DAILY        atorvastatin 80 MG tablet    LIPITOR    90 tablet    TAKE ONE TABLET BY MOUTH EVERY DAY    Hyperlipidemia LDL goal <130       nitroGLYcerin 0.4 MG sublingual tablet    NITROSTAT    60 tablet    Place 1 tablet under the tongue See Admin Instructions. for chest pain    CAD (coronary artery disease)

## 2018-06-29 NOTE — PROGRESS NOTES
Service Date: 06/28/2018      PRIMARY CARE PHYSICIAN:  Dr. Gregory Schoen.      HISTORY OF PRESENT ILLNESS:  I again had the pleasure of seeing your patient, Henrique Nelson, at [facility] for evaluation of coronary artery disease and hyperlipidemia.  The patient is status post subendocardial myocardial infarction in 04/2010 with a totally occluded diagonal branch artery.  This was stented using a drug-eluting stent and the patient has not had recurrent angina pectoris.  His last stress echocardiogram was performed on 04/30 demonstrating no areas of ischemia or infarction.  Ejection fraction was well maintained.  His most recent lipid profile 04/30/2018 showed total cholesterol 100, HDL 41, LDL 40 and triglycerides 94.  The patient denies myalgias or myopathy.  He is not exercising aerobically and we again discussed the need for every other day 30 minutes of aerobic exercise.  He smokes an occasional cigar and we discussed the need given his asthma and coronary artery disease to avoid smoking cigars or cigarettes.  His weight has stabilized and he is eating much less fast food and high-caloric food.  He has reduced his meat consumption and increased his pork, fish and chicken.  The patient denies hospitalizations or surgery since I saw him 1 year ago.  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  He denies palpitations, syncope, presyncope, or peripheral edema.      PHYSICAL EXAMINATION:  As listed below.      ASSESSMENT:   1.  Henrique Nelson is a delightful 48-year-old male status post diagonal branch artery myocardial infarction and intracoronary stenting in 04/2010.  His recent stress echocardiogram was normal.  The patient remains on aspirin and atorvastatin life-long.  His blood pressure is well controlled.  I again spent 30 minutes with the patient, greater than 50% of that time was counseling on diet, exercise and weight loss.   2.  The patient remains on  high-dose atorvastatin with excellent lipid profile.  We will continue to monitor and repeat this in 2 years.  He denies myalgias or myopathy.      It is my pleasure to assist in the care of Henrique Casas.  All his questions were answered to his satisfaction.      Vida Wright MD      cc:   Gregory Schoen, MD    [address]  [address]         VIDA WRIGHT MD, [facility]             D: 2018   T: 2018   MT: JADA      Name:     HENRIQUE CASAS   MRN:      [id number]        Account:      [id number]   :      1970           Service Date: 2018      Document: [id number]

## 2018-08-16 DIAGNOSIS — E78.5 HYPERLIPIDEMIA LDL GOAL <130: ICD-10-CM

## 2018-08-16 NOTE — TELEPHONE ENCOUNTER
"Requested Prescriptions   Pending Prescriptions Disp Refills     atorvastatin (LIPITOR) 80 MG tablet [Pharmacy Med Name: ATORVASTATIN CALCIUM 80MG TABS] 90 tablet 3    Last Written Prescription Date:  6-14-17  Last Fill Quantity: 90,  # refills: 3   Last office visit: 5/14/2018 with prescribing provider:  5/14/18   Future Office Visit:     Sig: TAKE ONE TABLET BY MOUTH EVERY DAY    Statins Protocol Passed    8/16/2018  3:18 PM       Passed - LDL on file in past 12 months    Recent Labs   Lab Test  04/30/18   0828   LDL  40            Passed - No abnormal creatine kinase in past 12 months    No lab results found.            Passed - Recent (12 mo) or future (30 days) visit within the authorizing provider's specialty    Patient had office visit in the last 12 months or has a visit in the next 30 days with authorizing provider or within the authorizing provider's specialty.  See \"Patient Info\" tab in inbasket, or \"Choose Columns\" in Meds & Orders section of the refill encounter.           Passed - Patient is age 18 or older          "

## 2018-08-17 RX ORDER — ATORVASTATIN CALCIUM 80 MG/1
80 TABLET, FILM COATED ORAL DAILY
Qty: 90 TABLET | Refills: 2 | Status: SHIPPED | OUTPATIENT
Start: 2018-08-17 | End: 2019-07-05

## 2018-10-04 ENCOUNTER — TELEPHONE (OUTPATIENT)
Dept: FAMILY MEDICINE | Facility: CLINIC | Age: 48
End: 2018-10-04

## 2018-10-05 ASSESSMENT — ASTHMA QUESTIONNAIRES: ACT_TOTALSCORE: 20

## 2018-11-29 DIAGNOSIS — J20.9 ACUTE BRONCHITIS, UNSPECIFIED ORGANISM: ICD-10-CM

## 2018-11-30 NOTE — TELEPHONE ENCOUNTER
"Requested Prescriptions   Pending Prescriptions Disp Refills     PROAIR  (90 Base) MCG/ACT inhaler [Pharmacy Med Name: PROAIR HFA 108MCG/ACT AERS] 8.5 g 3    Last Written Prescription Date:  5/14/18  Last Fill Quantity: 1 inhaler,  # refills: 3   Last office visit: 5/14/2018 with prescribing provider:     Future Office Visit:     Sig: INHALE 2 PUFFS INTO THE LUNGS EVERY 6 HOURS AS NEEDED FOR SHORTNESS OF BREATH, DIFFICULTY BREATHING OR WHEEZING.    Asthma Maintenance Inhalers - Anticholinergics Failed    11/29/2018  3:04 PM       Failed - Recent (6 mo) or future (30 days) visit within the authorizing provider's specialty    Patient had office visit in the last 6 months or has a visit in the next 30 days with authorizing provider or within the authorizing provider's specialty.  See \"Patient Info\" tab in inbasket, or \"Choose Columns\" in Meds & Orders section of the refill encounter.           Passed - Patient is age 12 years or older       Passed - Asthma control assessment score within normal limits in last 6 months    Please review ACT score.           Routing refill request to provider for review/approval because:  Drug not on the [facility] refill protocol for this Dx.  ISRAEL RileyN, RN          "

## 2018-12-03 RX ORDER — ALBUTEROL SULFATE 90 UG/1
AEROSOL, METERED RESPIRATORY (INHALATION)
Qty: 8.5 G | Refills: 3 | Status: SHIPPED | OUTPATIENT
Start: 2018-12-03 | End: 2019-06-21

## 2019-06-10 ENCOUNTER — OFFICE VISIT (OUTPATIENT)
Dept: FAMILY MEDICINE | Facility: OTHER | Age: 49
End: 2019-06-10
Payer: COMMERCIAL

## 2019-06-10 VITALS
TEMPERATURE: 97.1 F | SYSTOLIC BLOOD PRESSURE: 132 MMHG | DIASTOLIC BLOOD PRESSURE: 88 MMHG | RESPIRATION RATE: 17 BRPM | OXYGEN SATURATION: 97 % | BODY MASS INDEX: 32.9 KG/M2 | WEIGHT: 229.8 LBS | HEART RATE: 71 BPM | HEIGHT: 70 IN

## 2019-06-10 DIAGNOSIS — J32.9 SINUSITIS, UNSPECIFIED CHRONICITY, UNSPECIFIED LOCATION: Primary | ICD-10-CM

## 2019-06-10 PROCEDURE — 99213 OFFICE O/P EST LOW 20 MIN: CPT | Performed by: STUDENT IN AN ORGANIZED HEALTH CARE EDUCATION/TRAINING PROGRAM

## 2019-06-10 ASSESSMENT — MIFFLIN-ST. JEOR: SCORE: 1896.13

## 2019-06-10 NOTE — LETTER
[facility]  [id number] [place] [id number]  Phone: [phone]    Karen 10, 2019        Henrique Nelson  [address] [facility] [id number]        To whom it may concern:    RE: Henrique Nelson    Patient was seen and treated today at our clinic for viral illness. He is starting to feel better but did miss work 1.5 days last week due to his symptoms. He may return to work tonight.     Please contact me for questions or concerns.      Sincerely,          ALANA Cortes CNP

## 2019-06-10 NOTE — PROGRESS NOTES
Subjective     Henrique Nelson is a 49 year old male who presents to clinic today for the following health issues:    HPI   Acute Illness   Acute illness concerns: Cold symptoms  Onset: 5 days    Fever: YES- low grade    Chills/Sweats: YES    Headache (location?): no    Sinus Pressure:YES- post-nasal drainage, teeth pain and mucopurulent discharge    Conjunctivitis:  no    Ear Pain: YES- plugged    Rhinorrhea: YES    Congestion: YES    Sore Throat: no - throat is scratchy     Cough: YES - not anymore    Wheeze: YES    Decreased Appetite: YES    Nausea: no    Vomiting: no    Diarrhea:  YES    Dysuria/Freq.: no    Fatigue/Achiness: YES    Sick/Strep Exposure: no     Therapies Tried and outcome: Nyquil, dayquil, antihistamines - helps a little    Symptoms started Tuesday night of last week and were the worst on Wednesday.  His symptoms gradually improved over the following days until Saturday when they worsened again after he worked outside most of the day.  On  he felt a little better.  Today's feeling pretty good.    Patient Active Problem List   Diagnosis     Esophageal reflux     NONSPECIFIC MEDICAL HISTORY     Elevated blood pressure (not hypertension)     CAD (coronary artery disease)     Flank pain     Hyperlipidemia LDL goal <130     Old myocardial infarction     Metabolic syndrome     Mild intermittent asthma without complication     Burn of foot, third degree, right, sequela     Status post insertion of drug eluting coronary artery stent     Past Surgical History:   Procedure Laterality Date     C NONSPECIFIC PROCEDURE      Sinus surgery     CARDIAC CATHERIZATION  04/14/10     CORONARY ANGIOGRAPHY ADULT ORDER  4/14/10    4/14/10: JOELLEN to totally occluded diagonal     HC TOOTH EXTRACTION W/FORCEP      Maidens teeth     STENT, CORONARY, BIJAL  2010       Social History     Tobacco Use     Smoking status: Former Smoker     Types: Cigars     Last attempt to quit: 2010     Years since quittin.1  "    Smokeless tobacco: Never Used     Tobacco comment: occasional cigar   Substance Use Topics     Alcohol use: Yes     Alcohol/week: 0.0 oz     Comment: occasional     Family History   Problem Relation Age of Onset     Hypertension Maternal Grandfather      Allergies Sister      Breast Cancer Mother      Cancer Mother         Ovarian     Diabetes Maternal Aunt         insulin         Current Outpatient Medications   Medication Sig Dispense Refill     ASPIRIN 325 MG PO TBEC ONE DAILY  3     atorvastatin (LIPITOR) 80 MG tablet Take 1 tablet (80 mg) by mouth daily 90 tablet 2     nitroglycerin (NITROSTAT) 0.4 MG SL tablet Place 1 tablet under the tongue See Admin Instructions. for chest pain 60 tablet 3     PROAIR  (90 Base) MCG/ACT inhaler INHALE 2 PUFFS INTO THE LUNGS EVERY 6 HOURS AS NEEDED FOR SHORTNESS OF BREATH, DIFFICULTY BREATHING OR WHEEZING. 8.5 g 3     Allergies   Allergen Reactions     Cat Hair [Cats]      No Known Drug Allergies      BP Readings from Last 3 Encounters:   06/10/19 132/88   06/28/18 126/82   05/14/18 110/80    Wt Readings from Last 3 Encounters:   06/10/19 104.2 kg (229 lb 12.8 oz)   06/28/18 102.5 kg (226 lb)   05/14/18 103.4 kg (228 lb)                    Reviewed and updated as needed this visit by Provider         Review of Systems   ROS COMP: Constitutional, HEENT, cardiovascular, pulmonary, gi and gu systems are negative, except as otherwise noted.      Objective    /88   Pulse 71   Temp 97.1  F (36.2  C) (Temporal)   Resp 17   Ht 1.766 m (5' 9.53\")   Wt 104.2 kg (229 lb 12.8 oz)   SpO2 97%   BMI 33.42 kg/m    Body mass index is 33.42 kg/m .  Physical Exam   GENERAL: alert, no distress and obese  EYES: Eyes grossly normal to inspection, PERRL and conjunctivae and sclerae normal  HENT: ear canals and TM's normal, nose and mouth without ulcers or lesions  NECK: no adenopathy, no asymmetry, masses, or scars  RESP: lungs clear to auscultation - no rales, rhonchi or " wheezes  CV: regular rate and rhythm, normal S1 S2, no S3 or S4, no murmur, click or rub  MS: no gross musculoskeletal defects noted, no edema  SKIN: no suspicious lesions or rashes  NEURO: Normal strength and tone, mentation intact and speech normal    Diagnostic Test Results:  Labs reviewed in Epic        Assessment & Plan     1. Sinusitis, unspecified chronicity, unspecified location  Symptoms improving. Suspect viral etiology and that symptoms will improve with time. Recommend he is seen again if symptoms persist or worsen.  Letter given to patient that he may return to work tonight.    No follow-ups on file.    ALANA Cortes [facility]

## 2019-06-11 ASSESSMENT — ASTHMA QUESTIONNAIRES: ACT_TOTALSCORE: 20

## 2019-06-21 DIAGNOSIS — J20.9 ACUTE BRONCHITIS, UNSPECIFIED ORGANISM: ICD-10-CM

## 2019-06-24 RX ORDER — ALBUTEROL SULFATE 90 UG/1
AEROSOL, METERED RESPIRATORY (INHALATION)
Qty: 8.5 G | Refills: 3 | Status: SHIPPED | OUTPATIENT
Start: 2019-06-24 | End: 2020-04-01

## 2019-06-24 NOTE — TELEPHONE ENCOUNTER
"Proair  Last Written Prescription Date:  12/03/2018  Last Fill Quantity: 8.5g,  # refills: 3   Last office visit: 6/10/2019 with prescribing provider:  Rhiannon   Future Office Visit:  None  Prescription approved per [facility] Refill Protocol.    Requested Prescriptions   Pending Prescriptions Disp Refills     PROAIR  (90 Base) MCG/ACT inhaler [Pharmacy Med Name: PROAIR HFA 108MCG/ACT AERS] 8.5 g 3     Sig: INHALE 2 PUFFS INTO THE LUNGS EVERY 6 HOURS AS NEEDED FOR SHORTNESS OF BREATH, DIFFICULTY BREATHING OR WHEEZING.       Asthma Maintenance Inhalers - Anticholinergics Passed - 6/21/2019  5:53 PM        Passed - Patient is age 12 years or older        Passed - Asthma control assessment score within normal limits in last 6 months     Please review ACT score.         Passed - Medication is active on med list        Passed - Recent (6 mo) or future (30 days) visit within the authorizing provider's specialty     Patient had office visit in the last 6 months or has a visit in the next 30 days with authorizing provider or within the authorizing provider's specialty.  See \"Patient Info\" tab in inbasket, or \"Choose Columns\" in Meds & Orders section of the refill encounter.        Sri Grant RN   "

## 2019-07-05 ENCOUNTER — TELEPHONE (OUTPATIENT)
Dept: CARDIOLOGY | Facility: CLINIC | Age: 49
End: 2019-07-05

## 2019-07-05 DIAGNOSIS — E78.5 HYPERLIPIDEMIA LDL GOAL <130: ICD-10-CM

## 2019-07-05 NOTE — TELEPHONE ENCOUNTER
"Routing to PCP for refill if appropriate.  Routing schedulers for appointment with PCP.     Lipitor  Last Written Prescription Date:  08/17/2018  Last Fill Quantity: 90,  # refills: 2   Last office visit: 6/10/2019 with prescribing provider:  Rhiannon   Future Office Visit:  None  Routing refill request to provider for review/approval because:  Labs not current:  LDL    Requested Prescriptions   Pending Prescriptions Disp Refills     atorvastatin (LIPITOR) 80 MG tablet [Pharmacy Med Name: ATORVASTATIN CALCIUM 80MG TABS] 90 tablet 2     Sig: TAKE ONE TABLET BY MOUTH EVERY DAY       Statins Protocol Failed - 7/5/2019 12:28 PM        Failed - LDL on file in past 12 months     Recent Labs   Lab Test 04/30/18  0828   LDL 40           Failed - Recent (12 mo) or future (30 days) visit within the authorizing provider's specialty     Patient had office visit in the last 12 months or has a visit in the next 30 days with authorizing provider or within the authorizing provider's specialty.  See \"Patient Info\" tab in inbasket, or \"Choose Columns\" in Meds & Orders section of the refill encounter.          Passed - No abnormal creatine kinase in past 12 months     No lab results found.         Passed - Medication is active on med list        Passed - Patient is age 18 or older      Sri Grant RN   "

## 2019-07-05 NOTE — TELEPHONE ENCOUNTER
Patient called stating that he tried to book his annual OV with Dr. Michael but he is booked out till October. Pt denies any symptoms or concerns but would like to be seen soon for his peace of mind. Offered patient an [facility] OV for annual OV. Pt ok with seeing LISA.  Pt scheduled to see Yasmin ARTHUR 7/12/19.

## 2019-07-05 NOTE — TELEPHONE ENCOUNTER
Left message for patient to call back and speak with any .    Thank you!  Ghazal Acevedo ~ Patient Representative

## 2019-07-08 RX ORDER — ATORVASTATIN CALCIUM 80 MG/1
TABLET, FILM COATED ORAL
Qty: 90 TABLET | Refills: 2 | Status: SHIPPED | OUTPATIENT
Start: 2019-07-08 | End: 2020-04-16

## 2019-07-12 ENCOUNTER — OFFICE VISIT (OUTPATIENT)
Dept: CARDIOLOGY | Facility: CLINIC | Age: 49
End: 2019-07-12
Payer: COMMERCIAL

## 2019-07-12 ENCOUNTER — OFFICE VISIT (OUTPATIENT)
Dept: FAMILY MEDICINE | Facility: CLINIC | Age: 49
End: 2019-07-12
Payer: COMMERCIAL

## 2019-07-12 VITALS
HEART RATE: 72 BPM | OXYGEN SATURATION: 96 % | SYSTOLIC BLOOD PRESSURE: 138 MMHG | DIASTOLIC BLOOD PRESSURE: 82 MMHG | RESPIRATION RATE: 16 BRPM | WEIGHT: 236 LBS | TEMPERATURE: 96 F | BODY MASS INDEX: 34.32 KG/M2

## 2019-07-12 VITALS
WEIGHT: 239 LBS | BODY MASS INDEX: 34.22 KG/M2 | DIASTOLIC BLOOD PRESSURE: 88 MMHG | HEART RATE: 64 BPM | HEIGHT: 70 IN | OXYGEN SATURATION: 95 % | SYSTOLIC BLOOD PRESSURE: 124 MMHG

## 2019-07-12 DIAGNOSIS — J45.20 MILD INTERMITTENT ASTHMA WITHOUT COMPLICATION: ICD-10-CM

## 2019-07-12 DIAGNOSIS — I25.10 CORONARY ARTERY DISEASE INVOLVING NATIVE CORONARY ARTERY OF NATIVE HEART WITHOUT ANGINA PECTORIS: ICD-10-CM

## 2019-07-12 DIAGNOSIS — E78.5 HYPERLIPIDEMIA LDL GOAL <130: Primary | ICD-10-CM

## 2019-07-12 DIAGNOSIS — Z12.11 SPECIAL SCREENING FOR MALIGNANT NEOPLASMS, COLON: ICD-10-CM

## 2019-07-12 DIAGNOSIS — E78.5 HYPERLIPIDEMIA LDL GOAL <130: ICD-10-CM

## 2019-07-12 DIAGNOSIS — Z12.5 SCREENING FOR PROSTATE CANCER: ICD-10-CM

## 2019-07-12 DIAGNOSIS — I25.10 CORONARY ARTERY DISEASE INVOLVING NATIVE CORONARY ARTERY OF NATIVE HEART WITHOUT ANGINA PECTORIS: Primary | ICD-10-CM

## 2019-07-12 PROCEDURE — 99214 OFFICE O/P EST MOD 30 MIN: CPT | Performed by: FAMILY MEDICINE

## 2019-07-12 PROCEDURE — 99213 OFFICE O/P EST LOW 20 MIN: CPT | Performed by: NURSE PRACTITIONER

## 2019-07-12 ASSESSMENT — PAIN SCALES - GENERAL: PAINLEVEL: NO PAIN (0)

## 2019-07-12 ASSESSMENT — MIFFLIN-ST. JEOR: SCORE: 1937.89

## 2019-07-12 NOTE — LETTER
My Asthma Action Plan  Name: Henrique Nelson   YOB: 1970  Date: 7/12/2019   My doctor: Gregory G. Schoen, MD   My clinic: [facility]        My Control Medicine:   My Rescue Medicine:    My Asthma Severity:   Avoid your asthma triggers:                GREEN ZONE   Good Control    I feel good    No cough or wheeze    Can work, sleep and play without asthma symptoms       Take your asthma control medicine every day.     1. If exercise triggers your asthma, take your rescue medication    15 minutes before exercise or sports, and    During exercise if you have asthma symptoms  2. Spacer to use with inhaler: If you have a spacer, make sure to use it with your inhaler             YELLOW ZONE Getting Worse  I have ANY of these:    I do not feel good    Cough or wheeze    Chest feels tight    Wake up at night   1. Keep taking your Green Zone medications  2. Start taking your rescue medicine:    every 20 minutes for up to 1 hour. Then every 4 hours for 24-48 hours.  3. If you stay in the Yellow Zone for more than 12-24 hours, contact your doctor.  4. If you do not return to the Green Zone in 12-24 hours or you get worse, start taking your oral steroid medicine if prescribed by your provider.           RED ZONE Medical Alert - Get Help  I have ANY of these:    I feel awful    Medicine is not helping    Breathing getting harder    Trouble walking or talking    Nose opens wide to breathe       1. Take your rescue medicine NOW  2. If your provider has prescribed an oral steroid medicine, start taking it NOW  3. Call your doctor NOW  4. If you are still in the Red Zone after 20 minutes and you have not reached your doctor:    Take your rescue medicine again and    Call 911 or go to the emergency room right away    See your regular doctor within 2 weeks of an Emergency Room or Urgent Care visit for follow-up treatment.          Annual Reminders:  Meet with Asthma Educator,  Flu Shot in the Fall,  consider Pneumonia Vaccination for patients with asthma (aged 19 and older).    Pharmacy: [facility] [place] [address] [address]                      Asthma Triggers  How To Control Things That Make Your Asthma Worse    Triggers are things that make your asthma worse.  Look at the list below to help you find your triggers and what you can do about them.  You can help prevent asthma flare-ups by staying away from your triggers.      Trigger                                                          What you can do   Cigarette Smoke  Tobacco smoke can make asthma worse. Do not allow smoking in your home, car or around you.  Be sure no one smokes at a child s day care or school.  If you smoke, ask your health care provider for ways to help you quit.  Ask family members to quit too.  Ask your health care provider for a referral to Quit Plan to help you quit smoking, or call 4-973-161-PLAN.     Colds, Flu, Bronchitis  These are common triggers of asthma. Wash your hands often.  Don t touch your eyes, nose or mouth.  Get a flu shot every year.     Dust Mites  These are tiny bugs that live in cloth or carpet. They are too small to see. Wash sheets and blankets in hot water every week.   Encase pillows and mattress in dust mite proof covers.  Avoid having carpet if you can. If you have carpet, vacuum weekly.   Use a dust mask and HEPA vacuum.   Pollen and Outdoor Mold  Some people are allergic to trees, grass, or weed pollen, or molds. Try to keep your windows closed.  Limit time out doors when pollen count is high.   Ask you health care provider about taking medicine during allergy season.     Animal Dander  Some people are allergic to skin flakes, urine or saliva from pets with fur or feathers. Keep pets with fur or feathers out of your home.    If you can t keep the pet outdoors, then keep the pet out of your bedroom.  Keep the bedroom door closed.  Keep pets off cloth furniture and away from  stuffed toys.     Mice, Rats, and Cockroaches  Some people are allergic to the waste from these pests.   Cover food and garbage.  Clean up spills and food crumbs.  Store grease in the refrigerator.   Keep food out of the bedroom.   Indoor Mold  This can be a trigger if your home has high moisture. Fix leaking faucets, pipes, or other sources of water.   Clean moldy surfaces.  Dehumidify basement if it is damp and smelly.   Smoke, Strong Odors, and Sprays  These can reduce air quality. Stay away from strong odors and sprays, such as perfume, powder, hair spray, paints, smoke incense, paint, cleaning products, candles and new carpet.   Exercise or Sports  Some people with asthma have this trigger. Be active!  Ask your doctor about taking medicine before sports or exercise to prevent symptoms.    Warm up for 5-10 minutes before and after sports or exercise.     Other Triggers of Asthma  Cold air:  Cover your nose and mouth with a scarf.  Sometimes laughing or crying can be a trigger.  Some medicines and food can trigger asthma.

## 2019-07-12 NOTE — PROGRESS NOTES
HPI and Plan:   I had the pleasure of seeing Henrique Nelson today in cardiology clinic follow up. He is a pleasant 49 year old patient of Dr. Michael.    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 He has a history  of coronary artery disease and hyperlipidemia.  In 4/2010 he suffered a subendocardial myocardial infarction in 04/2010 with a totally occluded diagonal branch artery.  This was stented using a drug-eluting stent and the patient has not had recurrent angina pectoris.  His last stress echocardiogram was performed on 04/30/18 demonstrating no areas of ischemia or infarction.  Ejection fraction was well maintained.  His lipid profile last year showed total cholesterol 100, HDL 41, LDL 40 and triglycerides 94.      Mr. Nelson continues to feel well.  He walks 5 to 10 miles a night it when he is at work.  He has put on 10 pounds in the last few months.  He tells me that his father-in-law has been in a TCU and as a result sons schedule his change in his eating habits have changed with it.  He is aware of the weight gain and does not plan to continue it.  He continues to smoke a cigar when he mows the lawn.  He is on a statin and full dose aspirin, we discussed whether or not he should cut down to 81 mg aspirin and he decided that he would like to talk to Dr. Michael about that.      Physical Exam  Please see Below     Assessment and Plan  1.  Coronary artery disease.  He is not having any ischemic symptoms.  His blood pressure is controlled without any antihypertensive medications.  He is on statin and aspirin therapy.  His last stress test was without evidence of ischemia.    2.  hyperlipidemia.  He continues on Lipitor 80, the plan is to recheck his cholesterol numbers in a year, his most recent LDL was 40.    Thank you for allowing me to care for Henrique Nelson today.  I have asked him to see Dr. Michael in a year, he is currently on the October waiting list.    ALANA Bosch,  CNP  Cardiology    Voice recognition software was used for this note, I have reviewed this note, but errors may have been missed.    Orders Placed This Encounter   Procedures     Lipid Profile     Follow-Up with Cardiologist     No orders of the defined types were placed in this encounter.    There are no discontinued medications.      CURRENT MEDICATIONS:  Current Outpatient Medications   Medication Sig Dispense Refill     ASPIRIN 325 MG PO TBEC ONE DAILY  3     atorvastatin (LIPITOR) 80 MG tablet TAKE ONE TABLET BY MOUTH EVERY DAY 90 tablet 2     nitroglycerin (NITROSTAT) 0.4 MG SL tablet Place 1 tablet under the tongue See Admin Instructions. for chest pain 60 tablet 3     PROAIR  (90 Base) MCG/ACT inhaler INHALE 2 PUFFS INTO THE LUNGS EVERY 6 HOURS AS NEEDED FOR SHORTNESS OF BREATH, DIFFICULTY BREATHING OR WHEEZING. 8.5 g 3       ALLERGIES     Allergies   Allergen Reactions     Cat Hair [Cats]      No Known Drug Allergies        PAST MEDICAL HISTORY:  Past Medical History:   Diagnosis Date     Allergic rhinitis, cause unspecified     Allergic rhinitis     CAD (coronary artery disease)     Drug eluting stent to occluded diagonal on 4/14/10     Esophageal reflux      HLD (hyperlipidaemia)      Metabolic syndrome      Mild intermittent asthma      NONSPECIFIC MEDICAL HISTORY 1997    Avulsion fracture, right ankle     Old myocardial infarction April 2010 April 2010       PAST SURGICAL HISTORY:  Past Surgical History:   Procedure Laterality Date     C NONSPECIFIC PROCEDURE  1996    Sinus surgery     CARDIAC CATHERIZATION  04/14/10     CORONARY ANGIOGRAPHY ADULT ORDER  4/14/10    4/14/10: JOELLEN to totally occluded diagonal     HC TOOTH EXTRACTION W/FORCEP      [place]tt teeth     STENT, CORONARY, BIJAL  4/2010       FAMILY HISTORY:  Family History   Problem Relation Age of Onset     Hypertension Maternal Grandfather      Allergies Sister      Breast Cancer Mother      Cancer Mother         Ovarian     Diabetes  Maternal Aunt         insulin       SOCIAL HISTORY:  Social History     Socioeconomic History     Marital status:      Spouse name: Alberta     Number of children: 0     Years of education: None     Highest education level: None   Occupational History     Occupation: Shop work   Social Needs     Financial resource strain: None     Food insecurity:     Worry: None     Inability: None     Transportation needs:     Medical: None     Non-medical: None   Tobacco Use     Smoking status: Former Smoker     Types: Cigars     Last attempt to quit: 2010     Years since quittin.2     Smokeless tobacco: Never Used     Tobacco comment: occasional cigar   Substance and Sexual Activity     Alcohol use: Yes     Alcohol/week: 0.0 oz     Comment: occasional     Drug use: No     Sexual activity: Yes     Partners: Female   Lifestyle     Physical activity:     Days per week: None     Minutes per session: None     Stress: None   Relationships     Social connections:     Talks on phone: None     Gets together: None     Attends Mandaen service: None     Active member of club or organization: None     Attends meetings of clubs or organizations: None     Relationship status: None     Intimate partner violence:     Fear of current or ex partner: None     Emotionally abused: None     Physically abused: None     Forced sexual activity: None   Other Topics Concern     Parent/sibling w/ CABG, MI or angioplasty before 65F 55M? 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)     Occupational Exposure Not Asked     Hobby Hazards Not Asked     Sleep Concern Not Asked     Stress Concern Not Asked     Weight Concern Not Asked     Special Diet Not Asked     Back Care Not Asked     Exercise No     Bike Helmet Not Asked     Seat Belt Yes     Self-Exams Not Asked   Social History Narrative     None       Review of Systems:  Skin:  Negative       Eyes:   "Positive for glasses    ENT:  Negative nasal congestion    Respiratory:  Negative (Asthma)     Cardiovascular:  Negative      Gastroenterology: Negative      Genitourinary:  Negative      Musculoskeletal:  Negative      Neurologic:  Negative      Psychiatric:  Negative      Heme/Lymph/Imm:  Positive for allergies    Endocrine:  Negative        Physical Exam:  Vitals: /88   Pulse 64   Ht 1.766 m (5' 9.53\")   Wt 108.4 kg (239 lb)   SpO2 95%   BMI 34.76 kg/m      Constitutional:  cooperative, alert and oriented, well developed, well nourished, in no acute distress overweight      Skin:  warm and dry to the touch, no apparent skin lesions or masses noted          Head:  normocephalic, no masses or lesions        Eyes:  pupils equal and round        Lymph:      ENT:  no pallor or cyanosis, dentition good        Neck:  JVP normal        Respiratory:  normal breath sounds, clear to auscultation, normal A-P diameter, normal symmetry, normal respiratory excursion, no use of accessory muscles         Cardiac: regular rhythm, normal S1/S2, no S3 or S4, apical impulse not displaced, no murmurs, gallops or rubs                pulses full and equal, no bruits auscultated                                        GI:    obese      Extremities and Muscular Skeletal:  no edema              Neurological:  no gross motor deficits;affect appropriate        Psych:  Alert and Oriented x 3    Encounter Diagnoses   Name Primary?     Hyperlipidemia LDL goal <130      Coronary artery disease involving native coronary artery of native heart without angina pectoris Yes       Recent Lab Results:  LIPID RESULTS:  Lab Results   Component Value Date    CHOL 100 04/30/2018    HDL 41 04/30/2018    LDL 40 04/30/2018    TRIG 94 04/30/2018    CHOLHDLRATIO 2.7 04/10/2015       LIVER ENZYME RESULTS:  Lab Results   Component Value Date    AST 26 05/03/2012    ALT 41 08/05/2014       CBC RESULTS:  Lab Results   Component Value Date    WBC 9.4 " 03/28/2016    RBC 5.33 03/28/2016    HGB 16.1 03/28/2016    HCT 47.7 03/28/2016    MCV 90 03/28/2016    MCH 30.2 03/28/2016    MCHC 33.8 03/28/2016    RDW 13.1 03/28/2016     03/28/2016       BMP RESULTS:  Lab Results   Component Value Date     (H) 12/19/2015    POTASSIUM 3.9 12/19/2015    CHLORIDE 113 (H) 12/19/2015    CO2 23 12/19/2015    ANIONGAP 11 12/19/2015     (H) 12/19/2015    BUN 16 12/19/2015    CR 1.07 12/19/2015    GFRESTIMATED 74 12/19/2015    GFRESTBLACK 89 12/19/2015    DEEPTHI 8.0 (L) 12/19/2015        A1C RESULTS:  No results found for: A1C    INR RESULTS:  Lab Results   Component Value Date    INR 0.89 12/05/2008           CC  No referring provider defined for this encounter.

## 2019-07-12 NOTE — LETTER
7/12/2019    Gregory G. Schoen, MD  [address] [place] [address]    RE: Henrique Nelson       Dear Colleague,    I had the pleasure of seeing Henrique Nelson in the [facility].    HPI and Plan:   I had the pleasure of seeing Henrique Nelson today in cardiology clinic follow up. He is a pleasant 49 year old patient of Dr. Michael.    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 He has a history  of coronary artery disease and hyperlipidemia.   In 4/2010 he suffered a subendocardial myocardial infarction in 04/2010 with a totally occluded diagonal branch artery.  This was stented using a drug-eluting stent and the patient has not had recurrent angina pectoris.  His last stress echocardiogram was performed on 04/30/18 demonstrating no areas of ischemia or infarction.  Ejection fraction was well maintained.  His lipid profile last year showed total cholesterol 100, HDL 41, LDL 40 and triglycerides 94.      Mr. Nelson continues to feel well.  He walks 5 to 10 miles a night it when he is at work.  He has put on 10 pounds in the last few months.  He tells me that his father-in-law has been in a TCU and as a result sons schedule his change in his eating habits have changed with it.  He is aware of the weight gain and does not plan to continue it.  He continues to smoke a cigar when he mows the lawn.  He is on a statin and full dose aspirin, we discussed whether or not he should cut down to 81 mg aspirin and he decided that he would like to talk to Dr. Michael about that.      Physical Exam  Please see Below     Assessment and Plan  1.  Coronary artery disease.  He is not having any ischemic symptoms.  His blood pressure is controlled without any antihypertensive medications.  He is on statin and aspirin therapy.  His last stress test was without evidence of ischemia.    2.  hyperlipidemia.  He continues on Lipitor 80, the plan is to recheck his cholesterol numbers in a  year, his most recent LDL was 40.    Thank you for allowing me to care for Henrique Nelson today.  I have asked him to see Dr. Michael in a year, he is currently on the October waiting list.    ALANA Bosch, CNP  Cardiology    Voice recognition software was used for this note, I have reviewed this note, but errors may have been missed.    Orders Placed This Encounter   Procedures     Lipid Profile     Follow-Up with Cardiologist     No orders of the defined types were placed in this encounter.    There are no discontinued medications.      CURRENT MEDICATIONS:  Current Outpatient Medications   Medication Sig Dispense Refill     ASPIRIN 325 MG PO TBEC ONE DAILY  3     atorvastatin (LIPITOR) 80 MG tablet TAKE ONE TABLET BY MOUTH EVERY DAY 90 tablet 2     nitroglycerin (NITROSTAT) 0.4 MG SL tablet Place 1 tablet under the tongue See Admin Instructions. for chest pain 60 tablet 3     PROAIR  (90 Base) MCG/ACT inhaler INHALE 2 PUFFS INTO THE LUNGS EVERY 6 HOURS AS NEEDED FOR SHORTNESS OF BREATH, DIFFICULTY BREATHING OR WHEEZING. 8.5 g 3       ALLERGIES     Allergies   Allergen Reactions     Cat Hair [Cats]      No Known Drug Allergies        PAST MEDICAL HISTORY:  Past Medical History:   Diagnosis Date     Allergic rhinitis, cause unspecified     Allergic rhinitis     CAD (coronary artery disease)     Drug eluting stent to occluded diagonal on 4/14/10     Esophageal reflux      HLD (hyperlipidaemia)      Metabolic syndrome      Mild intermittent asthma      NONSPECIFIC MEDICAL HISTORY 1997    Avulsion fracture, right ankle     Old myocardial infarction April 2010 April 2010       PAST SURGICAL HISTORY:  Past Surgical History:   Procedure Laterality Date     C NONSPECIFIC PROCEDURE  1996    Sinus surgery     CARDIAC CATHERIZATION  04/14/10     CORONARY ANGIOGRAPHY ADULT ORDER  4/14/10    4/14/10: JOELLEN to totally occluded diagonal     HC TOOTH EXTRACTION W/FORCEP      [place]ott teeth     STENT, CORONARY,  BIJAL  2010       FAMILY HISTORY:  Family History   Problem Relation Age of Onset     Hypertension Maternal Grandfather      Allergies Sister      Breast Cancer Mother      Cancer Mother         Ovarian     Diabetes Maternal Aunt         insulin       SOCIAL HISTORY:  Social History     Socioeconomic History     Marital status:      Spouse name: Alberta     Number of children: 0     Years of education: None     Highest education level: None   Occupational History     Occupation: Shop work   Social Needs     Financial resource strain: None     Food insecurity:     Worry: None     Inability: None     Transportation needs:     Medical: None     Non-medical: None   Tobacco Use     Smoking status: Former Smoker     Types: Cigars     Last attempt to quit: 2010     Years since quittin.2     Smokeless tobacco: Never Used     Tobacco comment: occasional cigar   Substance and Sexual Activity     Alcohol use: Yes     Alcohol/week: 0.0 oz     Comment: occasional     Drug use: No     Sexual activity: Yes     Partners: Female   Lifestyle     Physical activity:     Days per week: None     Minutes per session: None     Stress: None   Relationships     Social connections:     Talks on phone: None     Gets together: None     Attends Mandaen service: None     Active member of club or organization: None     Attends meetings of clubs or organizations: None     Relationship status: None     Intimate partner violence:     Fear of current or ex partner: None     Emotionally abused: None     Physically abused: None     Forced sexual activity: None   Other Topics Concern     Parent/sibling w/ CABG, MI or angioplasty before 65F 55M? 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)     Occupational Exposure Not Asked     Hobby Hazards Not Asked     Sleep Concern Not Asked     Stress Concern Not Asked     Weight Concern Not Asked     Special  "Diet Not Asked     Back Care Not Asked     Exercise No     Bike Helmet Not Asked     Seat Belt Yes     Self-Exams Not Asked   Social History Narrative     None       Review of Systems:  Skin:  Negative       Eyes:  Positive for glasses    ENT:  Negative nasal congestion    Respiratory:  Negative (Asthma)     Cardiovascular:  Negative      Gastroenterology: Negative      Genitourinary:  Negative      Musculoskeletal:  Negative      Neurologic:  Negative      Psychiatric:  Negative      Heme/Lymph/Imm:  Positive for allergies    Endocrine:  Negative        Physical Exam:  Vitals: /88   Pulse 64   Ht 1.766 m (5' 9.53\")   Wt 108.4 kg (239 lb)   SpO2 95%   BMI 34.76 kg/m       Constitutional:  cooperative, alert and oriented, well developed, well nourished, in no acute distress overweight      Skin:  warm and dry to the touch, no apparent skin lesions or masses noted          Head:  normocephalic, no masses or lesions        Eyes:  pupils equal and round        Lymph:      ENT:  no pallor or cyanosis, dentition good        Neck:  JVP normal        Respiratory:  normal breath sounds, clear to auscultation, normal A-P diameter, normal symmetry, normal respiratory excursion, no use of accessory muscles         Cardiac: regular rhythm, normal S1/S2, no S3 or S4, apical impulse not displaced, no murmurs, gallops or rubs                pulses full and equal, no bruits auscultated                                        GI:    obese      Extremities and Muscular Skeletal:  no edema              Neurological:  no gross motor deficits;affect appropriate        Psych:  Alert and Oriented x 3    Encounter Diagnoses   Name Primary?     Hyperlipidemia LDL goal <130      Coronary artery disease involving native coronary artery of native heart without angina pectoris Yes       Recent Lab Results:  LIPID RESULTS:  Lab Results   Component Value Date    CHOL 100 04/30/2018    HDL 41 04/30/2018    LDL 40 04/30/2018    TRIG 94 " 04/30/2018    CHOLHDLRATIO 2.7 04/10/2015       LIVER ENZYME RESULTS:  Lab Results   Component Value Date    AST 26 05/03/2012    ALT 41 08/05/2014       CBC RESULTS:  Lab Results   Component Value Date    WBC 9.4 03/28/2016    RBC 5.33 03/28/2016    HGB 16.1 03/28/2016    HCT 47.7 03/28/2016    MCV 90 03/28/2016    MCH 30.2 03/28/2016    MCHC 33.8 03/28/2016    RDW 13.1 03/28/2016     03/28/2016       BMP RESULTS:  Lab Results   Component Value Date     (H) 12/19/2015    POTASSIUM 3.9 12/19/2015    CHLORIDE 113 (H) 12/19/2015    CO2 23 12/19/2015    ANIONGAP 11 12/19/2015     (H) 12/19/2015    BUN 16 12/19/2015    CR 1.07 12/19/2015    GFRESTIMATED 74 12/19/2015    GFRESTBLACK 89 12/19/2015    DEEPTHI 8.0 (L) 12/19/2015        A1C RESULTS:  No results found for: A1C    INR RESULTS:  Lab Results   Component Value Date    INR 0.89 12/05/2008           CC  No referring provider defined for this encounter.                    Thank you for allowing me to participate in the care of your patient.      Sincerely,     ALANA Pritchett [facility]    cc:   No referring provider defined for this encounter.

## 2019-07-12 NOTE — PROGRESS NOTES
Subjective     Henrique Nelson is a 49 year old male who presents to clinic today for the following health issues:    HPI   Hyperlipidemia Follow-Up      Are you having any of the following symptoms? (Select all that apply)  No complaints of shortness of breath, chest pain or pressure.  No increased sweating or nausea with activity.  No left-sided neck or arm pain.  No complaints of pain in calves when walking 1-2 blocks.    Are you regularly taking any medication or supplement to lower your cholesterol?   Yes- no side effects    Are you having muscle aches or other side effects that you think could be caused by your cholesterol lowering medication?  No        Amount of exercise or physical activity: None    Problems taking medications regularly: No    Medication side effects: none    Diet: regular (no restrictions)      Reviewed and updated as needed this visit by Provider          Using inhaler up to once or twice a day for the past two weeks. This is the longest period of time in the past few years that he has had to use the albuterol.     States he has been under a lot of stress lately as his father-in-law's health is been very poor.  The family has been arguing quite a bit about how to manage his care however that has improved in the recent few days.  Currently one brother-in-law was in a severe life-threatening car accident when going to provide care for his father-in-law and a second brother-in-law was diagnosed with acute appendicitis and a kidney stone as well as an intestinal mass in the past couple of weeks.  This is pretty much put the burden of care for their father-in-law on the patient and his wife but it sounds like they have reached some decisions about getting assistance from outside home to support.  He has been sleep deprived as he works nights but then has had to spend his days awake helping out with his father-in-law and he thinks that is playing into his respiratory issues as well.    He was  seen by cardiology earlier today and negotiated to try to get off his statin therapy but that was declined.  He is reluctant to reduce his aspirin from 325 mg to 81 mg until he can specifically speak with his cardiologist about that.    Review of Systems   ROS COMP: Constitutional, HEENT, cardiovascular, pulmonary, gi and gu systems are negative, except as otherwise noted.      Objective    /82   Pulse 72   Temp 96  F (35.6  C) (Temporal)   Resp 16   Wt 107 kg (236 lb)   SpO2 96%   BMI 34.32 kg/m    Body mass index is 34.32 kg/m .  Physical Exam   GENERAL: healthy, alert and no distress  EYES: Eyes grossly normal to inspection, PERRL and conjunctivae and sclerae normal  HENT: ear canals and TM's normal, nose and mouth without ulcers or lesions. Has diffuse erythema of his oropharynx and some food impaction in his tonsillar crypts.   NECK: no adenopathy, no asymmetry, masses, or scars and thyroid normal to palpation  RESP: lungs clear to auscultation - no rales, rhonchi or wheezes  CV: regular rate and rhythm, normal S1 S2, no S3 or S4, no murmur, click or rub, no peripheral edema and peripheral pulses strong  SKIN: no suspicious lesions or rashes  PSYCH: mentation appears normal, affect normal/bright    ASSESSMENT:   Hyperlipidemia LDL goal <130  Coronary artery disease involving native coronary artery of native heart without angina pectoris  Mild intermittent asthma without complication  Screening for prostate cancer  Special screening for malignant neoplasms, colon    PLAN:  No change to ASA and lipids based on discussion earlier today with cardiology.  He will stop by lab fasting in the next week for a lipid profile and alt to follow use of meds.      We discussed HM needs and he opted to not get the Shingrix at this time, reporting a plausible case of shingles to his left anterior chest wall a year or so ago.    Declined HIV screening due to low risk status.   Agreed to colonoscopy and this will be  scheduled for him.    Also discussed pros/cons of prostate cancer screening and wishes to proceed with PSA testing. He has no prostate symptoms.     We discussed use of intermittent steroid inhaler in 2-3 week blocks when he has flares such as at present and he said he would think about it as this situation is quite unusual to need his inhaler twice a day for this long of a time. Discussed AAP and reasons to be seen right away.      He will follow up on an annual basis.    Electronically signed by Greg Schoen, MD

## 2019-07-12 NOTE — LETTER
7/12/2019    Gregory G. Schoen, MD  [address] [place] [address]    RE: Henrique Nelson       Dear Colleague,    I had the pleasure of seeing Henrique Nelson in the [facility].    HPI and Plan:   I had the pleasure of seeing Henrique Nelson today in cardiology clinic follow up. He is a pleasant 49 year old patient of Dr. Michael.    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 He has a history  of coronary artery disease and hyperlipidemia.   In 4/2010 he suffered a subendocardial myocardial infarction in 04/2010 with a totally occluded diagonal branch artery.  This was stented using a drug-eluting stent and the patient has not had recurrent angina pectoris.  His last stress echocardiogram was performed on 04/30/18 demonstrating no areas of ischemia or infarction.  Ejection fraction was well maintained.  His lipid profile last year showed total cholesterol 100, HDL 41, LDL 40 and triglycerides 94.      Mr. Nelson continues to feel well.  He walks 5 to 10 miles a night it when he is at work.  He has put on 10 pounds in the last few months.  He tells me that his father-in-law has been in a TCU and as a result sons schedule his change in his eating habits have changed with it.  He is aware of the weight gain and does not plan to continue it.  He continues to smoke a cigar when he mows the lawn.  He is on a statin and full dose aspirin, we discussed whether or not he should cut down to 81 mg aspirin and he decided that he would like to talk to Dr. Michael about that.      Physical Exam  Please see Below     Assessment and Plan  1.  Coronary artery disease.  He is not having any ischemic symptoms.  His blood pressure is controlled without any antihypertensive medications.  He is on statin and aspirin therapy.  His last stress test was without evidence of ischemia.    2.  hyperlipidemia.  He continues on Lipitor 80, the plan is to recheck his cholesterol numbers in a  year, his most recent LDL was 40.    Thank you for allowing me to care for Henrique Nelson today.  I have asked him to see Dr. Michael in a year, he is currently on the October waiting list.    ALANA Bosch, CNP  Cardiology    Voice recognition software was used for this note, I have reviewed this note, but errors may have been missed.    Orders Placed This Encounter   Procedures     Lipid Profile     Follow-Up with Cardiologist     No orders of the defined types were placed in this encounter.    There are no discontinued medications.      CURRENT MEDICATIONS:  Current Outpatient Medications   Medication Sig Dispense Refill     ASPIRIN 325 MG PO TBEC ONE DAILY  3     atorvastatin (LIPITOR) 80 MG tablet TAKE ONE TABLET BY MOUTH EVERY DAY 90 tablet 2     nitroglycerin (NITROSTAT) 0.4 MG SL tablet Place 1 tablet under the tongue See Admin Instructions. for chest pain 60 tablet 3     PROAIR  (90 Base) MCG/ACT inhaler INHALE 2 PUFFS INTO THE LUNGS EVERY 6 HOURS AS NEEDED FOR SHORTNESS OF BREATH, DIFFICULTY BREATHING OR WHEEZING. 8.5 g 3       ALLERGIES     Allergies   Allergen Reactions     Cat Hair [Cats]      No Known Drug Allergies        PAST MEDICAL HISTORY:  Past Medical History:   Diagnosis Date     Allergic rhinitis, cause unspecified     Allergic rhinitis     CAD (coronary artery disease)     Drug eluting stent to occluded diagonal on 4/14/10     Esophageal reflux      HLD (hyperlipidaemia)      Metabolic syndrome      Mild intermittent asthma      NONSPECIFIC MEDICAL HISTORY 1997    Avulsion fracture, right ankle     Old myocardial infarction April 2010 April 2010       PAST SURGICAL HISTORY:  Past Surgical History:   Procedure Laterality Date     C NONSPECIFIC PROCEDURE  1996    Sinus surgery     CARDIAC CATHERIZATION  04/14/10     CORONARY ANGIOGRAPHY ADULT ORDER  4/14/10    4/14/10: JOELLEN to totally occluded diagonal     HC TOOTH EXTRACTION W/FORCEP      [place] teeth     STENT, CORONARY,  BIJAL  2010       FAMILY HISTORY:  Family History   Problem Relation Age of Onset     Hypertension Maternal Grandfather      Allergies Sister      Breast Cancer Mother      Cancer Mother         Ovarian     Diabetes Maternal Aunt         insulin       SOCIAL HISTORY:  Social History     Socioeconomic History     Marital status:      Spouse name: Alberta     Number of children: 0     Years of education: None     Highest education level: None   Occupational History     Occupation: Shop work   Social Needs     Financial resource strain: None     Food insecurity:     Worry: None     Inability: None     Transportation needs:     Medical: None     Non-medical: None   Tobacco Use     Smoking status: Former Smoker     Types: Cigars     Last attempt to quit: 2010     Years since quittin.2     Smokeless tobacco: Never Used     Tobacco comment: occasional cigar   Substance and Sexual Activity     Alcohol use: Yes     Alcohol/week: 0.0 oz     Comment: occasional     Drug use: No     Sexual activity: Yes     Partners: Female   Lifestyle     Physical activity:     Days per week: None     Minutes per session: None     Stress: None   Relationships     Social connections:     Talks on phone: None     Gets together: None     Attends Shinto service: None     Active member of club or organization: None     Attends meetings of clubs or organizations: None     Relationship status: None     Intimate partner violence:     Fear of current or ex partner: None     Emotionally abused: None     Physically abused: None     Forced sexual activity: None   Other Topics Concern     Parent/sibling w/ CABG, MI or angioplasty before 65F 55M? 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)     Occupational Exposure Not Asked     Hobby Hazards Not Asked     Sleep Concern Not Asked     Stress Concern Not Asked     Weight Concern Not Asked     Special  "Diet Not Asked     Back Care Not Asked     Exercise No     Bike Helmet Not Asked     Seat Belt Yes     Self-Exams Not Asked   Social History Narrative     None       Review of Systems:  Skin:  Negative       Eyes:  Positive for glasses    ENT:  Negative nasal congestion    Respiratory:  Negative (Asthma)     Cardiovascular:  Negative      Gastroenterology: Negative      Genitourinary:  Negative      Musculoskeletal:  Negative      Neurologic:  Negative      Psychiatric:  Negative      Heme/Lymph/Imm:  Positive for allergies    Endocrine:  Negative        Physical Exam:  Vitals: /88   Pulse 64   Ht 1.766 m (5' 9.53\")   Wt 108.4 kg (239 lb)   SpO2 95%   BMI 34.76 kg/m       Constitutional:  cooperative, alert and oriented, well developed, well nourished, in no acute distress overweight      Skin:  warm and dry to the touch, no apparent skin lesions or masses noted          Head:  normocephalic, no masses or lesions        Eyes:  pupils equal and round        Lymph:      ENT:  no pallor or cyanosis, dentition good        Neck:  JVP normal        Respiratory:  normal breath sounds, clear to auscultation, normal A-P diameter, normal symmetry, normal respiratory excursion, no use of accessory muscles         Cardiac: regular rhythm, normal S1/S2, no S3 or S4, apical impulse not displaced, no murmurs, gallops or rubs                pulses full and equal, no bruits auscultated                                        GI:    obese      Extremities and Muscular Skeletal:  no edema              Neurological:  no gross motor deficits;affect appropriate        Psych:  Alert and Oriented x 3    Encounter Diagnoses   Name Primary?     Hyperlipidemia LDL goal <130      Coronary artery disease involving native coronary artery of native heart without angina pectoris Yes       Recent Lab Results:  LIPID RESULTS:  Lab Results   Component Value Date    CHOL 100 04/30/2018    HDL 41 04/30/2018    LDL 40 04/30/2018    TRIG 94 " 04/30/2018    CHOLHDLRATIO 2.7 04/10/2015       LIVER ENZYME RESULTS:  Lab Results   Component Value Date    AST 26 05/03/2012    ALT 41 08/05/2014       CBC RESULTS:  Lab Results   Component Value Date    WBC 9.4 03/28/2016    RBC 5.33 03/28/2016    HGB 16.1 03/28/2016    HCT 47.7 03/28/2016    MCV 90 03/28/2016    MCH 30.2 03/28/2016    MCHC 33.8 03/28/2016    RDW 13.1 03/28/2016     03/28/2016       BMP RESULTS:  Lab Results   Component Value Date     (H) 12/19/2015    POTASSIUM 3.9 12/19/2015    CHLORIDE 113 (H) 12/19/2015    CO2 23 12/19/2015    ANIONGAP 11 12/19/2015     (H) 12/19/2015    BUN 16 12/19/2015    CR 1.07 12/19/2015    GFRESTIMATED 74 12/19/2015    GFRESTBLACK 89 12/19/2015    DEEPTHI 8.0 (L) 12/19/2015        A1C RESULTS:  No results found for: A1C    INR RESULTS:  Lab Results   Component Value Date    INR 0.89 12/05/2008           CC  No referring provider defined for this encounter.                Thank you for allowing me to participate in the care of your patient.  Sincerely,     ALANA [facility]

## 2019-07-15 ENCOUNTER — TELEPHONE (OUTPATIENT)
Dept: FAMILY MEDICINE | Facility: CLINIC | Age: 49
End: 2019-07-15

## 2019-07-15 NOTE — LETTER
[address] [address] [phone]        July 15, 2019    Henrique Nelson  [address] [address]

## 2019-07-15 NOTE — TELEPHONE ENCOUNTER
Date of colonoscopy/EGD: 7/25/19  Surgeon: Dr. Ovalle  Prep:Miralax  Packet:Colonoscopy/EGD instructions mailed to patient's home address.   Date: 7/15/2019      Surgery Scheduler

## 2019-07-15 NOTE — LETTER

## 2019-07-18 NOTE — PROVIDER NOTIFICATION
Patient stated he hasn't received pre-procedure Colonoscopy instructions.  Listed instructions e-mailed to [email or web address] per patient request.  Called patient to verify he received instructions.

## 2019-07-19 ENCOUNTER — TELEPHONE (OUTPATIENT)
Dept: FAMILY MEDICINE | Facility: CLINIC | Age: 49
End: 2019-07-19

## 2019-07-19 NOTE — TELEPHONE ENCOUNTER
Reason for call:  Other   Patient called regarding (reason for call): call back  Additional comments: Patient called, has a colonoscopy scheduled next Thursday 07/24/2019 - they sent him the Mirilax prep, states you should not take this for heart failure - He needs to know what prep he should take. Please call today, he would like this taken care of.     Phone number to reach patient:  Cell number on file:    Telephone Information:   Mobile [phone]       Best Time:  ASAP     Can we leave a detailed message on this number?  YES

## 2019-07-22 NOTE — TELEPHONE ENCOUNTER
Please inform Henrique that his last stress echocardiogram showed that he had a normal left ventricle function with ejection fraction over 70%.  There is no evidence that he has congestive heart failure at this time. This same function has held true in looking back at various heart tests as far back as 2011. While he does have coronary artery disease which is stable on his current medications, he does not have heart failure and can do the standard prep.   Electronically signed by Greg Schoen, MD

## 2019-07-25 ENCOUNTER — ANESTHESIA (OUTPATIENT)
Dept: GASTROENTEROLOGY | Facility: CLINIC | Age: 49
End: 2019-07-25
Payer: COMMERCIAL

## 2019-07-25 ENCOUNTER — ANESTHESIA EVENT (OUTPATIENT)
Dept: GASTROENTEROLOGY | Facility: CLINIC | Age: 49
End: 2019-07-25
Payer: COMMERCIAL

## 2019-07-25 ENCOUNTER — HOSPITAL ENCOUNTER (OUTPATIENT)
Facility: CLINIC | Age: 49
Discharge: HOME | End: 2019-07-25
Attending: SURGERY | Admitting: SURGERY
Payer: COMMERCIAL

## 2019-07-25 VITALS
SYSTOLIC BLOOD PRESSURE: 125 MMHG | HEART RATE: 69 BPM | OXYGEN SATURATION: 97 % | TEMPERATURE: 97.9 F | RESPIRATION RATE: 16 BRPM | DIASTOLIC BLOOD PRESSURE: 91 MMHG

## 2019-07-25 DIAGNOSIS — Z12.5 SCREENING FOR PROSTATE CANCER: ICD-10-CM

## 2019-07-25 DIAGNOSIS — E78.5 HYPERLIPIDEMIA LDL GOAL <130: ICD-10-CM

## 2019-07-25 LAB
ALT SERPL W P-5'-P-CCNC: 46 U/L (ref 0–70)
COLONOSCOPY: NORMAL
PSA SERPL-ACNC: 0.67 UG/L (ref 0–4)

## 2019-07-25 PROCEDURE — 84460 ALANINE AMINO (ALT) (SGPT): CPT | Performed by: FAMILY MEDICINE

## 2019-07-25 PROCEDURE — 45378 DIAGNOSTIC COLONOSCOPY: CPT | Performed by: SURGERY

## 2019-07-25 PROCEDURE — G0121 COLON CA SCRN NOT HI RSK IND: HCPCS | Performed by: SURGERY

## 2019-07-25 PROCEDURE — 25000125 ZZHC RX 250: Performed by: NURSE ANESTHETIST, CERTIFIED REGISTERED

## 2019-07-25 PROCEDURE — 36415 COLL VENOUS BLD VENIPUNCTURE: CPT | Performed by: FAMILY MEDICINE

## 2019-07-25 PROCEDURE — 25000128 H RX IP 250 OP 636: Performed by: NURSE ANESTHETIST, CERTIFIED REGISTERED

## 2019-07-25 PROCEDURE — 25800030 ZZH RX IP 258 OP 636: Performed by: NURSE ANESTHETIST, CERTIFIED REGISTERED

## 2019-07-25 PROCEDURE — G0103 PSA SCREENING: HCPCS | Performed by: FAMILY MEDICINE

## 2019-07-25 PROCEDURE — 37000008 ZZH ANESTHESIA TECHNICAL FEE, 1ST 30 MIN: Performed by: SURGERY

## 2019-07-25 RX ORDER — MEPERIDINE HYDROCHLORIDE 25 MG/ML
12.5 INJECTION INTRAMUSCULAR; INTRAVENOUS; SUBCUTANEOUS
Status: DISCONTINUED | OUTPATIENT
Start: 2019-07-25 | End: 2019-07-25 | Stop reason: HOSPADM

## 2019-07-25 RX ORDER — LIDOCAINE 40 MG/G
CREAM TOPICAL
Status: DISCONTINUED | OUTPATIENT
Start: 2019-07-25 | End: 2019-07-25 | Stop reason: HOSPADM

## 2019-07-25 RX ORDER — ONDANSETRON 2 MG/ML
4 INJECTION INTRAMUSCULAR; INTRAVENOUS
Status: DISCONTINUED | OUTPATIENT
Start: 2019-07-25 | End: 2019-07-25 | Stop reason: HOSPADM

## 2019-07-25 RX ORDER — ONDANSETRON 2 MG/ML
4 INJECTION INTRAMUSCULAR; INTRAVENOUS EVERY 30 MIN PRN
Status: DISCONTINUED | OUTPATIENT
Start: 2019-07-25 | End: 2019-07-25

## 2019-07-25 RX ORDER — ONDANSETRON 4 MG/1
4 TABLET, ORALLY DISINTEGRATING ORAL EVERY 30 MIN PRN
Status: DISCONTINUED | OUTPATIENT
Start: 2019-07-25 | End: 2019-07-25

## 2019-07-25 RX ORDER — NALOXONE HYDROCHLORIDE 0.4 MG/ML
.1-.4 INJECTION, SOLUTION INTRAMUSCULAR; INTRAVENOUS; SUBCUTANEOUS
Status: DISCONTINUED | OUTPATIENT
Start: 2019-07-25 | End: 2019-07-25 | Stop reason: HOSPADM

## 2019-07-25 RX ORDER — SODIUM CHLORIDE, SODIUM LACTATE, POTASSIUM CHLORIDE, CALCIUM CHLORIDE 600; 310; 30; 20 MG/100ML; MG/100ML; MG/100ML; MG/100ML
INJECTION, SOLUTION INTRAVENOUS CONTINUOUS
Status: DISCONTINUED | OUTPATIENT
Start: 2019-07-25 | End: 2019-07-25 | Stop reason: HOSPADM

## 2019-07-25 RX ORDER — LIDOCAINE HYDROCHLORIDE 20 MG/ML
INJECTION, SOLUTION INFILTRATION; PERINEURAL PRN
Status: DISCONTINUED | OUTPATIENT
Start: 2019-07-25 | End: 2019-07-25

## 2019-07-25 RX ORDER — PROPOFOL 10 MG/ML
INJECTION, EMULSION INTRAVENOUS PRN
Status: DISCONTINUED | OUTPATIENT
Start: 2019-07-25 | End: 2019-07-25

## 2019-07-25 RX ORDER — PROPOFOL 10 MG/ML
INJECTION, EMULSION INTRAVENOUS CONTINUOUS PRN
Status: DISCONTINUED | OUTPATIENT
Start: 2019-07-25 | End: 2019-07-25

## 2019-07-25 RX ORDER — NALOXONE HYDROCHLORIDE 0.4 MG/ML
.1-.4 INJECTION, SOLUTION INTRAMUSCULAR; INTRAVENOUS; SUBCUTANEOUS
Status: DISCONTINUED | OUTPATIENT
Start: 2019-07-25 | End: 2019-07-25

## 2019-07-25 RX ORDER — ONDANSETRON 4 MG/1
4 TABLET, ORALLY DISINTEGRATING ORAL EVERY 6 HOURS PRN
Status: DISCONTINUED | OUTPATIENT
Start: 2019-07-25 | End: 2019-07-25 | Stop reason: HOSPADM

## 2019-07-25 RX ORDER — FLUMAZENIL 0.1 MG/ML
0.2 INJECTION, SOLUTION INTRAVENOUS
Status: DISCONTINUED | OUTPATIENT
Start: 2019-07-25 | End: 2019-07-25 | Stop reason: HOSPADM

## 2019-07-25 RX ORDER — ONDANSETRON 2 MG/ML
4 INJECTION INTRAMUSCULAR; INTRAVENOUS EVERY 6 HOURS PRN
Status: DISCONTINUED | OUTPATIENT
Start: 2019-07-25 | End: 2019-07-25 | Stop reason: HOSPADM

## 2019-07-25 RX ADMIN — PROPOFOL 50 MG: 10 INJECTION, EMULSION INTRAVENOUS at 08:41

## 2019-07-25 RX ADMIN — PROPOFOL 150 MCG/KG/MIN: 10 INJECTION, EMULSION INTRAVENOUS at 08:41

## 2019-07-25 RX ADMIN — SODIUM CHLORIDE, POTASSIUM CHLORIDE, SODIUM LACTATE AND CALCIUM CHLORIDE: 600; 310; 30; 20 INJECTION, SOLUTION INTRAVENOUS at 07:49

## 2019-07-25 RX ADMIN — PROPOFOL 50 MG: 10 INJECTION, EMULSION INTRAVENOUS at 08:43

## 2019-07-25 RX ADMIN — LIDOCAINE HYDROCHLORIDE 50 MG: 20 INJECTION, SOLUTION INFILTRATION; PERINEURAL at 08:40

## 2019-07-25 NOTE — ANESTHESIA CARE TRANSFER NOTE
Patient: Henrique Nelson    Procedure(s):  COLONOSCOPY    Diagnosis: Special screening for malignant neoplasms, colon  Diagnosis Additional Information: No value filed.    Anesthesia Type:   MAC     Note:  Airway :Room Air  Patient transferred to:Phase II  Handoff Report: Identifed the Patient, Identified the Reponsible Provider, Reviewed the pertinent medical history, Discussed the surgical course, Reviewed Intra-OP anesthesia mangement and issues during anesthesia, Set expectations for post-procedure period and Allowed opportunity for questions and acknowledgement of understanding      Vitals: (Last set prior to Anesthesia Care Transfer)    CRNA VITALS  7/25/2019 0828 - 7/25/2019 0918      7/25/2019             Pulse:  68    SpO2:  98 %    Resp Rate (observed):  20                Electronically Signed By: ALANA Lema CRNA  July 25, 2019  9:18 AM

## 2019-07-25 NOTE — DISCHARGE INSTRUCTIONS
[facility]    Home Care Following Endoscopy    Dr. Ovalle      Activity:    You have just undergone an endoscopic procedure usually performed with conscious sedation.  Do not work or operate machinery (including a car) for at least 12 hours.      I encourage you to walk and attempt to pass this air as soon as possible.    Diet:    Return to the diet you were on before your procedure but eat lightly for the first 12-24 hours.    Drink plenty of water.    Resume any regular medications unless otherwise advised by your physician.  Please begin any new medication prescribed as a result of your procedure as directed by your physician.   Pain:    You may take Tylenol as needed for pain.  Expected Recovery:    You can expect some mild abdominal fullness and/or discomfort due to the air used to inflate your intestinal tract. It is also normal to have a mild sore throat after upper endoscopy.    Call Your Physician if You Have:    After Colonoscopy:  o Worsening persisting abdominal pain which is worse with activity.  o Fevers (>101 degrees F), chills or shakes.  o Passage of continued blood with bowel movements.   Any questions or concerns about your recovery, please call [phone] or after hours [phone] Nurse Advice Line.

## 2019-07-25 NOTE — ANESTHESIA PREPROCEDURE EVALUATION
Anesthesia Pre-Procedure Evaluation    Patient: Henrique Nelson   MRN: [id number] : 1970          Preoperative Diagnosis: Special screening for malignant neoplasms, colon    Procedure(s):  COLONOSCOPY    Past Medical History:   Diagnosis Date     Allergic rhinitis, cause unspecified     Allergic rhinitis     CAD (coronary artery disease)     Drug eluting stent to occluded diagonal on 4/14/10     Esophageal reflux      HLD (hyperlipidaemia)      Metabolic syndrome      Mild intermittent asthma      NONSPECIFIC MEDICAL HISTORY     Avulsion fracture, right ankle     Old myocardial infarction      Past Surgical History:   Procedure Laterality Date     C NONSPECIFIC PROCEDURE      Sinus surgery     CARDIAC CATHERIZATION  04/14/10     CORONARY ANGIOGRAPHY ADULT ORDER  4/14/10    4/14/10: JOELLEN to totally occluded diagonal     HC TOOTH EXTRACTION W/FORCEP      [place] teeth     STENT, CORONARY, BIJAL  2010       Anesthesia Evaluation     . Pt has had prior anesthetic. Type: General and MAC    No history of anesthetic complications          ROS/MED HX    ENT/Pulmonary:     (+)Intermittent asthma , . .    Neurologic:  - neg neurologic ROS     Cardiovascular:     (+) Dyslipidemia, hypertension--CAD (s/p drug eluting stent), --. : . . . :. . Previous cardiac testing date:results:Stress Testdate:18 results:   Stress  Exercise was stopped due to fatigue.  There was a normal BP response to exercise.  The patient exhibited no chest pain during exercise.  Target Heart Rate was achieved.  The EKG portion of this stress test was negative for inducible ischemia (see  echo results below).  Normal left ventricular function and wall motion at rest and post-stress.  The visual ejection fraction is estimated at >70%.  Left ventricular cavity size decreases with exercise.  Global LV systolic function augments with exercise.     Baseline  Normal baseline electrocardiogram.  Normal left ventricular  "wall motion.  The visual ejection fraction is estimated at 60-65%.ECG reviewed date:8/4/16 results:NSR, 61bpm date: results:          METS/Exercise Tolerance:  >4 METS   Hematologic:  - neg hematologic  ROS       Musculoskeletal:  - neg musculoskeletal ROS       GI/Hepatic:     (+) GERD       Renal/Genitourinary:  - ROS Renal section negative       Endo:  - neg endo ROS       Psychiatric:  - neg psychiatric ROS       Infectious Disease:  - neg infectious disease ROS       Malignancy:      - no malignancy   Other:    - neg other ROS                      Physical Exam  Normal systems: cardiovascular, pulmonary and dental    Airway   Mallampati: II  TM distance: >3 FB  Neck ROM: full    Dental     Cardiovascular   Rhythm and rate: regular and normal      Pulmonary    breath sounds clear to auscultation            Lab Results   Component Value Date    WBC 9.4 03/28/2016    HGB 16.1 03/28/2016    HCT 47.7 03/28/2016     03/28/2016    CRP <2.9 12/19/2015    SED 4 12/19/2015     (H) 12/19/2015    POTASSIUM 3.9 12/19/2015    CHLORIDE 113 (H) 12/19/2015    CO2 23 12/19/2015    BUN 16 12/19/2015    CR 1.07 12/19/2015     (H) 12/19/2015    DEEPTHI 8.0 (L) 12/19/2015    ALBUMIN 4.5 05/03/2012    PROTTOTAL 7.6 05/03/2012    ALT 41 08/05/2014    AST 26 05/03/2012    ALKPHOS 79 05/03/2012    BILITOTAL 0.8 05/03/2012    PTT 32 12/05/2008    INR 0.89 12/05/2008    TSH 1.22 03/28/2016       Preop Vitals  BP Readings from Last 3 Encounters:   07/12/19 138/82   07/12/19 124/88   06/10/19 132/88    Pulse Readings from Last 3 Encounters:   07/12/19 72   07/12/19 64   06/10/19 71      Resp Readings from Last 3 Encounters:   07/12/19 16   06/10/19 17   08/15/16 16    SpO2 Readings from Last 3 Encounters:   07/12/19 96%   07/12/19 95%   06/10/19 97%      Temp Readings from Last 1 Encounters:   07/12/19 96  F (35.6  C) (Temporal)    Ht Readings from Last 1 Encounters:   07/12/19 1.766 m (5' 9.53\")      Wt Readings from Last 1 " "Encounters:   07/12/19 107 kg (236 lb)    Estimated body mass index is 34.32 kg/m  as calculated from the following:    Height as of 7/12/19: 1.766 m (5' 9.53\").    Weight as of 7/12/19: 107 kg (236 lb).       Anesthesia Plan      History & Physical Review  History and physical reviewed and following examination; no interval change.Dr. Ovalle to complete pre op H&P prior to anesthesia induction.    ASA Status:  3 .    NPO Status:  > 8 hours    Plan for MAC with Propofol induction. Maintenance will be TIVA.  Reason for MAC:  Deep or markedly invasive procedure (G8)         Postoperative Care      Consents  Anesthetic plan, risks, benefits and alternatives discussed with:  Patient..                 ALANA Lema CRNA  "

## 2019-07-25 NOTE — ANESTHESIA POSTPROCEDURE EVALUATION
Patient: Henrique Nelson    Procedure(s):  COLONOSCOPY    Diagnosis:Special screening for malignant neoplasms, colon  Diagnosis Additional Information: No value filed.    Anesthesia Type:  MAC    Note:  Anesthesia Post Evaluation    Patient location during evaluation: Phase 2  Patient participation: Able to fully participate in evaluation  Level of consciousness: awake and alert  Pain management: adequate  Airway patency: patent  Cardiovascular status: acceptable and stable  Respiratory status: acceptable and room air  Hydration status: acceptable  PONV: none     Anesthetic complications: None    Comments:  Patient was happy with the anesthesia care received and no anesthesia related complications were noted.  I will follow up with the patient again if it is needed.        Last vitals:  Vitals:    07/25/19 0740 07/25/19 0900   BP: 126/82    Resp: 16 16   Temp: 97.9  F (36.6  C)    SpO2: 96% 97%         Electronically Signed By: ALANA Lema CRNA  July 25, 2019  9:24 AM

## 2019-09-23 ENCOUNTER — TELEPHONE (OUTPATIENT)
Dept: FAMILY MEDICINE | Facility: CLINIC | Age: 49
End: 2019-09-23

## 2019-09-23 DIAGNOSIS — J20.9 ACUTE BRONCHITIS, UNSPECIFIED ORGANISM: Primary | ICD-10-CM

## 2019-09-23 DIAGNOSIS — J45.20 MILD INTERMITTENT ASTHMA WITHOUT COMPLICATION: ICD-10-CM

## 2019-09-23 NOTE — TELEPHONE ENCOUNTER
Reason for Call:  Other prescription    Detailed comments: Henrique calls to ask if there is another inhaler he could try that is cheaper than the PROAIR he is using.    [facility]    Phone Number Patient can be reached at: Home number on file [phone] (home)    Best Time: any    Can we leave a detailed message on this number? YES    Call taken on 9/23/2019 at 4:22 PM by Marily Guardado

## 2019-09-25 RX ORDER — ALBUTEROL SULFATE 90 UG/1
2 AEROSOL, METERED RESPIRATORY (INHALATION) EVERY 6 HOURS
Qty: 1 INHALER | Refills: 11 | Status: SHIPPED | OUTPATIENT
Start: 2019-09-25 | End: 2020-10-29

## 2019-09-27 ENCOUNTER — HEALTH MAINTENANCE LETTER (OUTPATIENT)
Age: 49
End: 2019-09-27

## 2020-03-31 ENCOUNTER — TELEPHONE (OUTPATIENT)
Dept: FAMILY MEDICINE | Facility: CLINIC | Age: 50
End: 2020-03-31

## 2020-03-31 DIAGNOSIS — J20.9 ACUTE BRONCHITIS, UNSPECIFIED ORGANISM: ICD-10-CM

## 2020-03-31 DIAGNOSIS — J45.20 MILD INTERMITTENT ASTHMA WITHOUT COMPLICATION: Primary | ICD-10-CM

## 2020-03-31 NOTE — TELEPHONE ENCOUNTER
Reason for Call:  Medication or medication refill:    Do you use a [facility]?  Name of the pharmacy and phone number for the current request:  [facility] - [phone]    Name of the medication requested: Patient is in need of the nebulizer medication. He suffers from allergies in the spring and wants to have this medication available if needed.     Other request: Patient is aware that PCP is not in the office and is ok with waiting for their return before hearing anything back.     Can we leave a detailed message on this number? YES    Phone number patient can be reached at: Home number on file [phone] (home)    Best Time: any    Call taken on 3/31/2020 at 5:45 PM by Angelia Grace CNA

## 2020-04-01 RX ORDER — ALBUTEROL SULFATE 0.83 MG/ML
2.5 SOLUTION RESPIRATORY (INHALATION) EVERY 6 HOURS PRN
Qty: 1 BOX | Refills: 3 | Status: SHIPPED | OUTPATIENT
Start: 2020-04-01

## 2020-04-01 RX ORDER — ALBUTEROL SULFATE 90 UG/1
AEROSOL, METERED RESPIRATORY (INHALATION)
Qty: 8.5 G | Refills: 3 | Status: SHIPPED | OUTPATIENT
Start: 2020-04-01 | End: 2020-10-29

## 2020-04-01 NOTE — TELEPHONE ENCOUNTER
Please inform that I sent a refill of his inhaler to the pharmacy.  I'm not sure of availability of an inhalers at this point but if not available, we could then get him a nebulizer machine and nebs.   Electronically signed by Greg Schoen, MD

## 2020-04-01 NOTE — TELEPHONE ENCOUNTER
Patient has the neb machine just would like the solution but says he will take the inhaler as well.  The last I see patient was prescribed this in 2003.  If we can send for patient, please let patient know.  Andra Lopez, [facility]

## 2020-04-16 DIAGNOSIS — E78.5 HYPERLIPIDEMIA LDL GOAL <130: ICD-10-CM

## 2020-04-16 RX ORDER — ATORVASTATIN CALCIUM 80 MG/1
TABLET, FILM COATED ORAL
Qty: 90 TABLET | Refills: 2 | Status: SHIPPED | OUTPATIENT
Start: 2020-04-16 | End: 2020-04-22

## 2020-04-16 NOTE — TELEPHONE ENCOUNTER
"Lipitor  Last Written Prescription Date:  07/08/2019  Last Fill Quantity: 90,  # refills: 2   Last office visit: 7/12/2019 with prescribing provider:  Schoen   Future Office Visit:  None  Routing refill request to provider for review/approval because:  Labs not current:  LDL.     Requested Prescriptions   Pending Prescriptions Disp Refills     atorvastatin (LIPITOR) 80 MG tablet [Pharmacy Med Name: ATORVASTATIN CALCIUM 80MG TABS] 90 tablet 2     Sig: TAKE ONE TABLET BY MOUTH ONCE DAILY       Statins Protocol Failed - 4/16/2020  2:53 PM        Failed - LDL on file in past 12 months     Recent Labs   Lab Test 04/30/18  0828   LDL 40           Passed - No abnormal creatine kinase in past 12 months     No lab results found.         Passed - Recent (12 mo) or future (30 days) visit within the authorizing provider's specialty     Patient has had an office visit with the authorizing provider or a provider within the authorizing providers department within the previous 12 mos or has a future within next 30 days. See \"Patient Info\" tab in inbasket, or \"Choose Columns\" in Meds & Orders section of the refill encounter.          Passed - Medication is active on med list        Passed - Patient is age 18 or older         Sri Grant RN   "

## 2020-04-17 ENCOUNTER — ALLIED HEALTH/NURSE VISIT (OUTPATIENT)
Dept: FAMILY MEDICINE | Facility: CLINIC | Age: 50
End: 2020-04-17
Payer: COMMERCIAL

## 2020-04-17 VITALS — DIASTOLIC BLOOD PRESSURE: 82 MMHG | SYSTOLIC BLOOD PRESSURE: 124 MMHG | HEART RATE: 68 BPM

## 2020-04-17 DIAGNOSIS — I25.10 CORONARY ARTERY DISEASE INVOLVING NATIVE CORONARY ARTERY OF NATIVE HEART WITHOUT ANGINA PECTORIS: ICD-10-CM

## 2020-04-17 DIAGNOSIS — E78.5 HYPERLIPIDEMIA LDL GOAL <130: ICD-10-CM

## 2020-04-17 DIAGNOSIS — I25.10 CORONARY ARTERY DISEASE INVOLVING NATIVE CORONARY ARTERY OF NATIVE HEART WITHOUT ANGINA PECTORIS: Primary | ICD-10-CM

## 2020-04-17 LAB
CHOLEST SERPL-MCNC: 130 MG/DL
HDLC SERPL-MCNC: 43 MG/DL
LDLC SERPL CALC-MCNC: 65 MG/DL
NONHDLC SERPL-MCNC: 87 MG/DL
TRIGL SERPL-MCNC: 109 MG/DL

## 2020-04-17 PROCEDURE — 80061 LIPID PANEL: CPT | Performed by: NURSE PRACTITIONER

## 2020-04-17 PROCEDURE — 99207 ZZC NO CHARGE NURSE ONLY: CPT

## 2020-04-17 PROCEDURE — 36415 COLL VENOUS BLD VENIPUNCTURE: CPT | Performed by: NURSE PRACTITIONER

## 2020-04-17 NOTE — NURSING NOTE
Henrique Nelson is a 49 year old patient who comes in today for a Blood Pressure check.  Initial BP:  /82 (Cuff Size: Adult Regular)   Pulse 68      68  Disposition: follow-up as previously indicated by provider  Sara Louie CMA ([facility])    Henrique was in clinic today for lab work, and asked to have his blood pressure checked. His cardiologist wanted it checked. He denies any symptoms. He will be following up with his cardiologist.

## 2020-04-22 ENCOUNTER — VIRTUAL VISIT (OUTPATIENT)
Dept: CARDIOLOGY | Facility: CLINIC | Age: 50
End: 2020-04-22
Attending: NURSE PRACTITIONER
Payer: COMMERCIAL

## 2020-04-22 DIAGNOSIS — I25.10 CORONARY ARTERY DISEASE INVOLVING NATIVE CORONARY ARTERY OF NATIVE HEART WITHOUT ANGINA PECTORIS: ICD-10-CM

## 2020-04-22 DIAGNOSIS — E78.5 HYPERLIPIDEMIA LDL GOAL <130: ICD-10-CM

## 2020-04-22 PROCEDURE — 99214 OFFICE O/P EST MOD 30 MIN: CPT | Mod: 95 | Performed by: NURSE PRACTITIONER

## 2020-04-22 RX ORDER — ATORVASTATIN CALCIUM 80 MG/1
80 TABLET, FILM COATED ORAL DAILY
Qty: 90 TABLET | Refills: 3 | Status: SHIPPED | OUTPATIENT
Start: 2020-04-22 | End: 2021-05-13

## 2020-04-22 NOTE — LETTER
4/22/2020      RE: Henrique Nelson  [address] [facility] [id number]       Dear Colleague,    Thank you for the opportunity to participate in the care of your patient, Henrique Nelson, at the [facility]. Please see a copy of my visit note below.    Type of service:  Video Visit    Video End Time (time video stopped): 7:47    Originating Location (pt. Location): Home       HPI and Plan:   I had the pleasure of having a video visit with Henrique Nelson today in cardiology clinic follow up. He is a pleasant 49 year old patient of Dr. Michael who would like to follow with Dr. Carlsno in the future.    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 He has a history  of coronary artery disease and hyperlipidemia.  In 4/2010 he suffered a subendocardial myocardial infarction in 04/2010 with a totally occluded diagonal branch artery.  This was stented using a drug-eluting stent and the patient has not had recurrent angina pectoris.  His last stress echocardiogram was performed on 04/30/18 demonstrating no areas of ischemia or infarction.  Ejection fraction was well maintained.  He had his blood pressure checked and cholesterol checks before our visit today, both were excellent.  He is not on any antihypertensives and his blood pressure was 124/82.  Labs checked April 17 showed total cholesterol 130, HDL 43 and LDL 65.      Mr. Nelson continues to feel well.  He walks 5 to 10 miles a night it when he is at work, he continues not to have any symptoms or limitations with this.  A year ago he had gained about 10 pounds, he continues to maintain his weight at home at 233 pounds.  His only problems more recently has been his asthma, he treats it with inhaler and occasionally a nebulizer.  He is denies any chest pain or exertional shortness of breath, PND or orthopnea.  He is continued to take 325 mg aspirin, this was   Fernanda's recommendation for him, he is also on Lipitor 80.    Physical Exam  Constitutional: alert and oriented, in no acute distress on room air.  Skin: Clean, dry, intact  Eyes: Without icterus, no jaundice  ENT: Normal speech  Neurologic: Alert and oriented x3    Assessment and Plan  1.  Coronary artery disease.  He had a occluded diagonal branch in April 2010 that was successfully intervened on.  He has not had any recurrent angina.  His last stress test was in 2018.  He continues on statin and aspirin therapy.  His blood pressures are soft enough that he does not need to be on beta-blockade.  He continues to feel well without any ischemic symptoms.  Going forward I will arrange follow-up for him to meet Dr. Nicolas in the [place] office.  I explained to him that is unlikely Dr. Michael will be able to return back to work.    2.  Hyperlipidemia.  Treated, LDL 65.      Thank you for allowing me to care for Henrique Nelson today.    ALANA Bosch, CNP  Cardiology    Voice recognition software was used for this note, I have reviewed this note, but errors may have been missed.    Orders Placed This Encounter   Procedures     Follow-Up with Cardiologist     Orders Placed This Encounter   Medications     atorvastatin (LIPITOR) 80 MG tablet     Sig: Take 1 tablet (80 mg) by mouth daily     Dispense:  90 tablet     Refill:  3     Medications Discontinued During This Encounter   Medication Reason     atorvastatin (LIPITOR) 80 MG tablet Reorder       CURRENT MEDICATIONS:  Current Outpatient Medications   Medication Sig Dispense Refill     albuterol (PROAIR HFA) 108 (90 Base) MCG/ACT inhaler INHALE 2 PUFFS INTO THE LUNGS EVERY 6 HOURS AS NEEDED FOR SHORTNESS OF BREATH, DIFFICULTY BREATHING OR WHEEZING. 8.5 g 3     albuterol (PROAIR HFA/PROVENTIL HFA/VENTOLIN HFA) 108 (90 Base) MCG/ACT inhaler Inhale 2 puffs into the lungs every 6 hours 1 Inhaler 11     albuterol (PROVENTIL) (2.5 MG/3ML) 0.083% neb solution  Take 1 vial (2.5 mg) by nebulization every 6 hours as needed for shortness of breath / dyspnea or wheezing 1 Box 3     ASPIRIN 325 MG PO TBEC ONE DAILY  3     atorvastatin (LIPITOR) 80 MG tablet Take 1 tablet (80 mg) by mouth daily 90 tablet 3     nitroglycerin (NITROSTAT) 0.4 MG SL tablet Place 1 tablet under the tongue See Admin Instructions. for chest pain 60 tablet 3       ALLERGIES     Allergies   Allergen Reactions     Cat Hair [Cats]      No Known Drug Allergies      PAST MEDICAL HISTORY:  Past Medical History:   Diagnosis Date     Allergic rhinitis, cause unspecified     Allergic rhinitis     CAD (coronary artery disease)     Drug eluting stent to occluded diagonal on 4/14/10     Esophageal reflux      HLD (hyperlipidaemia)      Metabolic syndrome      Mild intermittent asthma      NONSPECIFIC MEDICAL HISTORY 1997    Avulsion fracture, right ankle     Old myocardial infarction April 2010 April 2010     PAST SURGICAL HISTORY:  Past Surgical History:   Procedure Laterality Date     C NONSPECIFIC PROCEDURE  1996    Sinus surgery     CARDIAC CATHERIZATION  04/14/10     COLONOSCOPY N/A 7/25/2019    Procedure: COLONOSCOPY;  Surgeon: Silvio Ovalle DO;  Location:  GI     CORONARY ANGIOGRAPHY ADULT ORDER  4/14/10    4/14/10: JOELLEN to totally occluded diagonal     HC TOOTH EXTRACTION W/FORCEP      [place] teeth     STENT, CORONARY, BIJAL  4/2010       FAMILY HISTORY:  Family History   Problem Relation Age of Onset     Hypertension Maternal Grandfather      Allergies Sister      Breast Cancer Mother      Cancer Mother         Ovarian     Diabetes Maternal Aunt         insulin     SOCIAL HISTORY:  Social History     Socioeconomic History     Marital status:      Spouse name: Alberta     Number of children: 0     Years of education: None     Highest education level: None   Occupational History     Occupation: Shop work   Social Needs     Financial resource strain: None     Food insecurity     Worry: None      Inability: None     Transportation needs     Medical: None     Non-medical: None   Tobacco Use     Smoking status: Former Smoker     Types: Cigars     Last attempt to quit: 2010     Years since quittin.9     Smokeless tobacco: Never Used     Tobacco comment: occasional cigar   Substance and Sexual Activity     Alcohol use: Yes     Alcohol/week: 0.0 standard drinks     Comment: occasional     Drug use: No     Sexual activity: Yes     Partners: Female   Lifestyle     Physical activity     Days per week: None     Minutes per session: None     Stress: None   Relationships     Social connections     Talks on phone: None     Gets together: None     Attends [employer] service: None     Active member of club or organization: None     Attends meetings of clubs or organizations: None     Relationship status: None     Intimate partner violence     Fear of current or ex partner: None     Emotionally abused: None     Physically abused: None     Forced sexual activity: None   Other Topics Concern     Parent/sibling w/ CABG, MI or angioplasty before 65F 55M? 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)     Occupational Exposure Not Asked     Hobby Hazards Not Asked     Sleep Concern Not Asked     Stress Concern Not Asked     Weight Concern Not Asked     Special Diet Not Asked     Back Care Not Asked     Exercise No     Bike Helmet Not Asked     Seat Belt Yes     Self-Exams Not Asked   Social History Narrative     None       Encounter Diagnoses   Name Primary?     Hyperlipidemia LDL goal <130      Coronary artery disease involving native coronary artery of native heart without angina pectoris      Recent Lab Results:  LIPID RESULTS:  Lab Results   Component Value Date    CHOL 130 2020    HDL 43 2020    LDL 65 2020    TRIG 109 2020    CHOLHDLRATIO 2.7 04/10/2015     LIVER ENZYME RESULTS:  Lab Results    Component Value Date    AST 26 05/03/2012    ALT 46 07/25/2019     CBC RESULTS:  Lab Results   Component Value Date    WBC 9.4 03/28/2016    RBC 5.33 03/28/2016    HGB 16.1 03/28/2016    HCT 47.7 03/28/2016    MCV 90 03/28/2016    MCH 30.2 03/28/2016    MCHC 33.8 03/28/2016    RDW 13.1 03/28/2016     03/28/2016     BMP RESULTS:  Lab Results   Component Value Date     (H) 12/19/2015    POTASSIUM 3.9 12/19/2015    CHLORIDE 113 (H) 12/19/2015    CO2 23 12/19/2015    ANIONGAP 11 12/19/2015     (H) 12/19/2015    BUN 16 12/19/2015    CR 1.07 12/19/2015    GFRESTIMATED 74 12/19/2015    GFRESTBLACK 89 12/19/2015    DEEPTHI 8.0 (L) 12/19/2015     A1C RESULTS:  No results found for: A1C    INR RESULTS:  Lab Results   Component Value Date    INR 0.89 12/05/2008     Distant Location (provider location):  [facility]     Mode of Communication:  Video Conference via Truistimity    Please do not hesitate to contact me if you have any questions/concerns.     Sincerely,     ALANA Pritchett CNP

## 2020-04-22 NOTE — PROGRESS NOTES
"Henrique Nelson is a 49 year old male who is being evaluated via a billable video visit.      The patient has been notified of following:     \"This video visit will be conducted via a call between you and your physician/provider. We have found that certain health care needs can be provided without the need for an in-person physical exam.  This service lets us provide the care you need with a video conversation.  If a prescription is necessary we can send it directly to your pharmacy.  If lab work is needed we can place an order for that and you can then stop by our lab to have the test done at a later time.    Video visits are billed at different rates depending on your insurance coverage.  Please reach out to your insurance provider with any questions.    If during the course of the call the physician/provider feels a video visit is not appropriate, you will not be charged for this service.\"    Patient has given verbal consent for Video visit? Yes    How would you like to obtain your AVS? Macho    Patient would like the video invitation sent by: [phone]  Will anyone else be joining your video visit? No   Review Of Systems  Skin: NEGATIVE  Eyes:Ears/Nose/Throat: NEGATIVE  Respiratory: SOB due to allergies and asthma  Cardiovascular:NEGATIVE  Gastrointestinal: NEGATIVE  Genitourinary:NEGATIVE   Musculoskeletal: NEGATIVE  Neurologic: NEGATIVE  Psychiatric: NEGATIVE  Hematologic/Lymphatic/Immunologic: NEGATIVE  Endocrine:  NEGATIVE  Last b/p on 4/17/2020 was pt reported  124/82  Patient will be using smart phone.  Lolly Rashid LPN        Video Start Time: 7:38 AM      Video-Visit Details    Type of service:  Video Visit    Video End Time (time video stopped): 7:47    Originating Location (pt. Location): Home       HPI and Plan:   I had the pleasure of having a video visit with Henrique Nelson today in cardiology clinic follow up. He is a pleasant 49 year old patient of Dr. Michael who would like to follow with " Dr. Carlson in the future.    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 He has a history  of coronary artery disease and hyperlipidemia.  In 4/2010 he suffered a subendocardial myocardial infarction in 04/2010 with a totally occluded diagonal branch artery.  This was stented using a drug-eluting stent and the patient has not had recurrent angina pectoris.  His last stress echocardiogram was performed on 04/30/18 demonstrating no areas of ischemia or infarction.  Ejection fraction was well maintained.  He had his blood pressure checked and cholesterol checks before our visit today, both were excellent.  He is not on any antihypertensives and his blood pressure was 124/82.  Labs checked April 17 showed total cholesterol 130, HDL 43 and LDL 65.      Mr. Nelson continues to feel well.  He walks 5 to 10 miles a night it when he is at work, he continues not to have any symptoms or limitations with this.  A year ago he had gained about 10 pounds, he continues to maintain his weight at home at 233 pounds.  His only problems more recently has been his asthma, he treats it with inhaler and occasionally a nebulizer.  He is denies any chest pain or exertional shortness of breath, PND or orthopnea.  He is continued to take 325 mg aspirin, this was Dr. Michael's recommendation for him, he is also on Lipitor 80.    Physical Exam  Constitutional: alert and oriented, in no acute distress on room air.  Skin: Clean, dry, intact  Eyes: Without icterus, no jaundice  ENT: Normal speech  Neurologic: Alert and oriented x3    Assessment and Plan  1.  Coronary artery disease.  He had a occluded diagonal branch in April 2010 that was successfully intervened on.  He has not had any recurrent angina.  His last stress test was in 2018.  He continues on statin and aspirin therapy.  His blood pressures are soft enough that he does not need to be on beta-blockade.  He continues to feel well without any ischemic symptoms.  Going forward  I will arrange follow-up for him to meet Dr. Nicolas in the [place] office.  I explained to him that is unlikely Dr. Michael will be able to return back to work.    2.  Hyperlipidemia.  Treated, LDL 65.      Thank you for allowing me to care for Henrique Nelson today.    ALANA Bosch, CNP  Cardiology    Voice recognition software was used for this note, I have reviewed this note, but errors may have been missed.    Orders Placed This Encounter   Procedures     Follow-Up with Cardiologist     Orders Placed This Encounter   Medications     atorvastatin (LIPITOR) 80 MG tablet     Sig: Take 1 tablet (80 mg) by mouth daily     Dispense:  90 tablet     Refill:  3     Medications Discontinued During This Encounter   Medication Reason     atorvastatin (LIPITOR) 80 MG tablet Reorder         CURRENT MEDICATIONS:  Current Outpatient Medications   Medication Sig Dispense Refill     albuterol (PROAIR HFA) 108 (90 Base) MCG/ACT inhaler INHALE 2 PUFFS INTO THE LUNGS EVERY 6 HOURS AS NEEDED FOR SHORTNESS OF BREATH, DIFFICULTY BREATHING OR WHEEZING. 8.5 g 3     albuterol (PROAIR HFA/PROVENTIL HFA/VENTOLIN HFA) 108 (90 Base) MCG/ACT inhaler Inhale 2 puffs into the lungs every 6 hours 1 Inhaler 11     albuterol (PROVENTIL) (2.5 MG/3ML) 0.083% neb solution Take 1 vial (2.5 mg) by nebulization every 6 hours as needed for shortness of breath / dyspnea or wheezing 1 Box 3     ASPIRIN 325 MG PO TBEC ONE DAILY  3     atorvastatin (LIPITOR) 80 MG tablet Take 1 tablet (80 mg) by mouth daily 90 tablet 3     nitroglycerin (NITROSTAT) 0.4 MG SL tablet Place 1 tablet under the tongue See Admin Instructions. for chest pain 60 tablet 3       ALLERGIES     Allergies   Allergen Reactions     Cat Hair [Cats]      No Known Drug Allergies        PAST MEDICAL HISTORY:  Past Medical History:   Diagnosis Date     Allergic rhinitis, cause unspecified     Allergic rhinitis     CAD (coronary artery disease)     Drug eluting stent to occluded  diagonal on 4/14/10     Esophageal reflux      HLD (hyperlipidaemia)      Metabolic syndrome      Mild intermittent asthma      NONSPECIFIC MEDICAL HISTORY     Avulsion fracture, right ankle     Old myocardial infarction        PAST SURGICAL HISTORY:  Past Surgical History:   Procedure Laterality Date     C NONSPECIFIC PROCEDURE      Sinus surgery     CARDIAC CATHERIZATION  04/14/10     COLONOSCOPY N/A 2019    Procedure: COLONOSCOPY;  Surgeon: Silvio Ovalle DO;  Location:  GI     CORONARY ANGIOGRAPHY ADULT ORDER  4/14/10    4/14/10: JOELLEN to totally occluded diagonal     HC TOOTH EXTRACTION W/FORCEP      [place] teeth     STENT, CORONARY, BIJAL  2010       FAMILY HISTORY:  Family History   Problem Relation Age of Onset     Hypertension Maternal Grandfather      Allergies Sister      Breast Cancer Mother      Cancer Mother         Ovarian     Diabetes Maternal Aunt         insulin       SOCIAL HISTORY:  Social History     Socioeconomic History     Marital status:      Spouse name: [place]     Number of children: 0     Years of education: None     Highest education level: None   Occupational History     Occupation: Shop work   Social Needs     Financial resource strain: None     Food insecurity     Worry: None     Inability: None     Transportation needs     Medical: None     Non-medical: None   Tobacco Use     Smoking status: Former Smoker     Types: Cigars     Last attempt to quit: 2010     Years since quittin.9     Smokeless tobacco: Never Used     Tobacco comment: occasional cigar   Substance and Sexual Activity     Alcohol use: Yes     Alcohol/week: 0.0 standard drinks     Comment: occasional     Drug use: No     Sexual activity: Yes     Partners: Female   Lifestyle     Physical activity     Days per week: None     Minutes per session: None     Stress: None   Relationships     Social connections     Talks on phone: None     Gets together: None      Attends Yarsanism service: None     Active member of club or organization: None     Attends meetings of clubs or organizations: None     Relationship status: None     Intimate partner violence     Fear of current or ex partner: None     Emotionally abused: None     Physically abused: None     Forced sexual activity: None   Other Topics Concern     Parent/sibling w/ CABG, MI or angioplasty before 65F 55M? 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)     Occupational Exposure Not Asked     Hobby Hazards Not Asked     Sleep Concern Not Asked     Stress Concern Not Asked     Weight Concern Not Asked     Special Diet Not Asked     Back Care Not Asked     Exercise No     Bike Helmet Not Asked     Seat Belt Yes     Self-Exams Not Asked   Social History Narrative     None       Encounter Diagnoses   Name Primary?     Hyperlipidemia LDL goal <130      Coronary artery disease involving native coronary artery of native heart without angina pectoris        Recent Lab Results:  LIPID RESULTS:  Lab Results   Component Value Date    CHOL 130 04/17/2020    HDL 43 04/17/2020    LDL 65 04/17/2020    TRIG 109 04/17/2020    CHOLHDLRATIO 2.7 04/10/2015       LIVER ENZYME RESULTS:  Lab Results   Component Value Date    AST 26 05/03/2012    ALT 46 07/25/2019       CBC RESULTS:  Lab Results   Component Value Date    WBC 9.4 03/28/2016    RBC 5.33 03/28/2016    HGB 16.1 03/28/2016    HCT 47.7 03/28/2016    MCV 90 03/28/2016    MCH 30.2 03/28/2016    MCHC 33.8 03/28/2016    RDW 13.1 03/28/2016     03/28/2016       BMP RESULTS:  Lab Results   Component Value Date     (H) 12/19/2015    POTASSIUM 3.9 12/19/2015    CHLORIDE 113 (H) 12/19/2015    CO2 23 12/19/2015    ANIONGAP 11 12/19/2015     (H) 12/19/2015    BUN 16 12/19/2015    CR 1.07 12/19/2015    GFRESTIMATED 74 12/19/2015    GFRESTBLACK 89 12/19/2015    DEEPTHI 8.0 (L) 12/19/2015         A1C RESULTS:  No results found for: A1C    INR RESULTS:  Lab Results   Component Value Date    INR 0.89 12/05/2008           CC  ALANA Adams CNP  [id number]  [place] [address]    Distant Location (provider location):  [facility]     Mode of Communication:  Video Conference via Doximity    ALANA Pritchett CNP

## 2020-07-08 ENCOUNTER — VIRTUAL VISIT (OUTPATIENT)
Dept: FAMILY MEDICINE | Facility: OTHER | Age: 50
End: 2020-07-08

## 2020-07-08 DIAGNOSIS — Z20.822 SUSPECTED COVID-19 VIRUS INFECTION: Primary | ICD-10-CM

## 2020-07-08 DIAGNOSIS — Z20.822 SUSPECTED COVID-19 VIRUS INFECTION: ICD-10-CM

## 2020-07-08 PROCEDURE — U0003 INFECTIOUS AGENT DETECTION BY NUCLEIC ACID (DNA OR RNA); SEVERE ACUTE RESPIRATORY SYNDROME CORONAVIRUS 2 (SARS-COV-2) (CORONAVIRUS DISEASE [COVID-19]), AMPLIFIED PROBE TECHNIQUE, MAKING USE OF HIGH THROUGHPUT TECHNOLOGIES AS DESCRIBED BY CMS-2020-01-R: HCPCS | Performed by: FAMILY MEDICINE

## 2020-07-08 NOTE — PROGRESS NOTES
"Date: 2020 08:24:25  Clinician: Александр Cuevas  Clinician NPI: [id number]  Patient: Henrique Nelson  Patient : 1970  Patient Address: [address] [place] [address]  Patient Phone: [phone]  Visit Protocol: URI  Patient Summary:  Henrique is a 50 year old ( : 1970 ) male who initiated a Visit for COVID-19 (Coronavirus) evaluation and screening. When asked the question \"Please sign me up to receive news, health information and promotions from [employer].\", Henrique responded \"No\".    Henrique states his symptoms started 1-2 days ago.   His symptoms consist of rhinitis, malaise, a headache, a sore throat, myalgia, a cough, and nasal congestion. He is experiencing mild difficulty breathing with activities but can speak normally in full sentences.   Symptom details     Nasal secretions: The color of his mucus is clear.    Cough: Henrique coughs a few times an hour and his cough is not more bothersome at night. Phlegm does not come into his throat when he coughs. He does not believe his cough is caused by post-nasal drip.     Sore throat: Henrique reports having mild throat pain (1-3 on a 10 point pain scale), does not have exudate on his tonsils, and can swallow liquids. The lymph nodes in his neck are not enlarged. A rash has not appeared on the skin since the sore throat started.     Headache: He states the headache is mild (1-3 on a 10 point pain scale).      Henrique denies having wheezing, nausea, teeth pain, ageusia, diarrhea, vomiting, ear pain, chills, enlarged lymph nodes, anosmia, facial pain or pressure, and fever. He also denies having recent facial or sinus surgery in the past 60 days and taking antibiotic medication in the past month.   Precipitating events  Within the past week, Henrique has not been exposed to someone with strep throat. He has not recently been exposed to someone with influenza. Henrique has not been in close contact with any high risk individuals.   Pertinent COVID-19 (Coronavirus) " information  In the past 14 days, Henrique has not worked in a congregate living setting.   He does not work or volunteer as healthcare worker or a  and does not work or volunteer in a healthcare facility.   Henrique also has not lived in a congregate living setting in the past 14 days. He does not live with a healthcare worker.   Henrique has not had a close contact with a laboratory-confirmed COVID-19 patient within 14 days of symptom onset.   Pertinent medical history  Henrique needs a return to work/school note.   Weight: 235 lbs   Henrique does not smoke or use smokeless tobacco.   Weight: 235 lbs    MEDICATIONS: albuterol sulfate inhalation, aspirin oral, atorvastatin oral, ALLERGIES: NKDA  Clinician Response:  Dear Henrique,   Your symptoms show that you may have coronavirus (COVID-19). This illness can cause fever, cough and trouble breathing. Many people get a mild case and get better on their own. Some people can get very sick.  What should I do?  We would like to test you for this virus.   1. Please call [phone] to schedule your visit. Explain that you were referred by [facility] to have a COVID-19 test. Be ready to share your [facility] visit ID number.  The following will serve as your written order for this COVID Test, ordered by me, for the indication of suspected COVID [Z20.828]: The test will be ordered in [facility], our electronic health record, after you are scheduled. It will show as ordered and authorized by Sean Caballero MD.  Order: COVID-19 (Coronavirus) PCR for SYMPTOMATIC testing from [facility].      2. When it's time for your COVID test:  Stay at least 6 feet away from others. (If someone will drive you to your test, stay in the backseat, as far away from the  as you can.)   Cover your mouth and nose with a mask, tissue or washcloth.  Go straight to the testing site. Don't make any stops on the way there or back.      3.Starting now: Stay home and away from others (self-isolate) until:   You've had no  "fever---and no medicine that reduces fever---for 3 full days (72 hours). And...   Your other symptoms have gotten better. For example, your cough or breathing has improved. And...   At least 10 days have passed since your symptoms started.       During this time, don't leave the house except for testing or medical care.   Stay in your own room, even for meals. Use your own bathroom if you can.   Stay away from others in your home. No hugging, kissing or shaking hands. No visitors.  Don't go to work, school or anywhere else.    Clean \"high touch\" surfaces often (doorknobs, counters, handles, etc.). Use a household cleaning spray or wipes. You'll find a full list of  on the EPA website: www.epa.gov/pesticide-registration/list-n-disinfectants-use-against-sars-cov-2.   Cover your mouth and nose with a mask, tissue or washcloth to avoid spreading germs.  Wash your hands and face often. Use soap and water.  Caregivers in these groups are at risk for severe illness due to COVID-19:  o People 65 years and older  o People who live in a nursing home or long-term care facility  o People with chronic disease (lung, heart, cancer, diabetes, kidney, liver, immunologic)  o People who have a weakened immune system, including those who:   Are in cancer treatment  Take medicine that weakens the immune system, such as corticosteroids  Had a bone marrow or organ transplant  Have an immune deficiency  Have poorly controlled HIV or AIDS  Are obese (body mass index of 40 or higher)  Smoke regularly   o Caregivers should wear gloves while washing dishes, handling laundry and cleaning bedrooms and bathrooms.  o Use caution when washing and drying laundry: Don't shake dirty laundry, and use the warmest water setting that you can.  o For more tips, go to www.cdc.gov/coronavirus/2019-ncov/downloads/10Things.pdf.    4.Sign up for [employer]. We know it's scary to hear that you might have COVID-19. We want to track your symptoms to " make sure you're okay over the next 2 weeks. Please look for an email from [employer]---this is a free, online program that we'll use to keep in touch. To sign up, follow the link in the email. Learn more at [email or web address]  How can I take care of myself?   Get lots of rest. Drink extra fluids (unless a doctor has told you not to).   Take Tylenol (acetaminophen) for fever or pain. If you have liver or kidney problems, ask your family doctor if it's okay to take Tylenol.   Adults can take either:    650 mg (two 325 mg pills) every 4 to 6 hours, or...   1,000 mg (two 500 mg pills) every 8 hours as needed.    Note: Don't take more than 3,000 mg in one day. Acetaminophen is found in many medicines (both prescribed and over-the-counter medicines). Read all labels to be sure you don't take too much.   For children, check the Tylenol bottle for the right dose. The dose is based on the child's age or weight.    If you have other health problems (like cancer, heart failure, an organ transplant or severe kidney disease): Call your specialty clinic if you don't feel better in the next 2 days.       Know when to call 911. Emergency warning signs include:    Trouble breathing or shortness of breath Pain or pressure in the chest that doesn't go away Feeling confused like you haven't felt before, or not being able to wake up Bluish-colored lips or face.  Where can I get more information?   [facility] -- About COVID-19: [email or web address]   CDC -- What to Do If You're Sick: www.cdc.gov/coronavirus/2019-ncov/about/steps-when-sick.html   CDC -- Ending Home Isolation: www.cdc.gov/coronavirus/2019-ncov/hcp/disposition-in-home-patients.html   CDC -- Caring for Someone: www.cdc.gov/coronavirus/2019-ncov/if-you-are-sick/care-for-someone.html   [facility] -- Interim Guidance for Hospital Discharge to Home: www.health.[facility].mn.us/diseases/coronavirus/hcp/hospdischarge.pdf   [facility]  trials (COVID-19 research studies): clinicalaffairs.[facility]/n-clinical-trials    Below are the COVID-19 hotlines at the [place] Department of Health ([facility]). Interpreters are available.    For health questions: Call 369-136-2233 or 1-696.254.6426 (7 a.m. to 7 p.m.) For questions about schools and childcare: Call 231-212-0565 or 1-465.352.2420 (7 a.m. to 7 p.m.)    Diagnosis: Myalgia  Diagnosis ICD: M79.1

## 2020-07-08 NOTE — LETTER
July 13, 2020        Henrique Nelson  [address] [place] [id number]    This letter provides a written record that you were tested for COVID-19 on 7/8/20.       Your result was negative. This means that we didn t find the virus that causes COVID-19 in your sample. A test may show negative when you do actually have the virus. This can happen when the virus is in the early stages of infection, before you feel illness symptoms.    If you have symptoms   Stay home and away from others (self-isolate) until you meet ALL of the guidelines below:    You ve had no fever--and no medicine that reduces fever--for 3 full days (72 hours). And      Your other symptoms have gotten better. For example, your cough or breathing has improved. And     At least 10 days have passed since your symptoms started.    During this time:    Stay home. Don t go to work, school or anywhere else.     Stay in your own room, including for meals. Use your own bathroom if you can.    Stay away from others in your home. No hugging, kissing or shaking hands. No visitors.    Clean  high touch  surfaces often (doorknobs, counters, handles, etc.). Use a household cleaning spray or wipes. You can find a full list on the EPA website at www.epa.gov/pesticide-registration/list-n-disinfectants-use-against-sars-cov-2.    Cover your mouth and nose with a mask, tissue or washcloth to avoid spreading germs.    Wash your hands and face often with soap and water.    Going back to work  Check with your employer for any guidelines to follow for going back to work.    Employers: This document serves as formal notice that your employee tested negative for COVID-19, as of the testing date shown above.

## 2020-07-09 LAB
SARS-COV-2 RNA SPEC QL NAA+PROBE: NOT DETECTED
SPECIMEN SOURCE: NORMAL

## 2020-10-29 DIAGNOSIS — J20.9 ACUTE BRONCHITIS, UNSPECIFIED ORGANISM: ICD-10-CM

## 2020-10-29 DIAGNOSIS — J45.20 MILD INTERMITTENT ASTHMA WITHOUT COMPLICATION: ICD-10-CM

## 2020-10-29 NOTE — TELEPHONE ENCOUNTER
Reason for Call:  Medication or medication refill:    Do you use a [facility]?  Name of the pharmacy and phone number for the current request:  [facility]- [phone]    Name of the medication requested: albuterol & atorvastatin    Other request: pt is requesting 3 mo supply albuterol    Can we leave a detailed message on this number? YES    Phone number patient can be reached at: Home number on file [phone] (home)    Best Time:     Call taken on 10/29/2020 at 12:07 PM by Irina Adkins

## 2020-10-30 RX ORDER — ALBUTEROL SULFATE 90 UG/1
AEROSOL, METERED RESPIRATORY (INHALATION)
Qty: 18 G | Refills: 3 | Status: SHIPPED | OUTPATIENT
Start: 2020-10-30 | End: 2021-09-21

## 2021-01-09 ENCOUNTER — HEALTH MAINTENANCE LETTER (OUTPATIENT)
Age: 51
End: 2021-01-09

## 2021-05-13 DIAGNOSIS — E78.5 HYPERLIPIDEMIA LDL GOAL <130: ICD-10-CM

## 2021-05-13 RX ORDER — ATORVASTATIN CALCIUM 80 MG/1
80 TABLET, FILM COATED ORAL DAILY
Qty: 90 TABLET | Refills: 0 | Status: SHIPPED | OUTPATIENT
Start: 2021-05-13 | End: 2021-08-20

## 2021-08-20 ENCOUNTER — TELEPHONE (OUTPATIENT)
Dept: CARDIOLOGY | Facility: CLINIC | Age: 51
End: 2021-08-20

## 2021-08-20 DIAGNOSIS — E78.5 HYPERLIPIDEMIA LDL GOAL <130: ICD-10-CM

## 2021-08-20 RX ORDER — ATORVASTATIN CALCIUM 80 MG/1
80 TABLET, FILM COATED ORAL DAILY
Qty: 30 TABLET | Refills: 0 | Status: SHIPPED | OUTPATIENT
Start: 2021-08-20 | End: 2022-04-06

## 2021-09-21 DIAGNOSIS — J20.9 ACUTE BRONCHITIS, UNSPECIFIED ORGANISM: ICD-10-CM

## 2021-09-21 RX ORDER — ALBUTEROL SULFATE 90 UG/1
AEROSOL, METERED RESPIRATORY (INHALATION)
Qty: 54 G | Refills: 1 | Status: SHIPPED | OUTPATIENT
Start: 2021-09-21 | End: 2022-02-16

## 2021-09-21 NOTE — TELEPHONE ENCOUNTER
Patient wanting 3 inhalers at a time, please send new script for 3 inhalers, thank you    Klarissa Lechuga, Pharmacy Technician  [facility]  [phone]

## 2021-10-23 ENCOUNTER — HEALTH MAINTENANCE LETTER (OUTPATIENT)
Age: 51
End: 2021-10-23

## 2022-02-12 ENCOUNTER — HEALTH MAINTENANCE LETTER (OUTPATIENT)
Age: 52
End: 2022-02-12

## 2022-02-15 DIAGNOSIS — J20.9 ACUTE BRONCHITIS, UNSPECIFIED ORGANISM: ICD-10-CM

## 2022-02-16 RX ORDER — ALBUTEROL SULFATE 90 UG/1
AEROSOL, METERED RESPIRATORY (INHALATION)
Qty: 54 G | Refills: 1 | Status: SHIPPED | OUTPATIENT
Start: 2022-02-16 | End: 2022-07-21

## 2022-02-16 NOTE — TELEPHONE ENCOUNTER
Routing refill request to provider for review/approval because:  Diagnosis not on refill protocol.     Sri Grant Rn

## 2022-04-05 DIAGNOSIS — E78.5 HYPERLIPIDEMIA LDL GOAL <130: ICD-10-CM

## 2022-04-06 RX ORDER — ATORVASTATIN CALCIUM 80 MG/1
TABLET, FILM COATED ORAL
Qty: 90 TABLET | Refills: 1 | Status: SHIPPED | OUTPATIENT
Start: 2022-04-06 | End: 2022-10-22

## 2022-04-06 NOTE — TELEPHONE ENCOUNTER
Routing refill request to provider for review/approval because:  Labs not current:  LDL  Patient needs to be seen because it has been more than 1 year since last office visit.    Sri Grant Rn

## 2022-07-16 DIAGNOSIS — J20.9 ACUTE BRONCHITIS, UNSPECIFIED ORGANISM: ICD-10-CM

## 2022-07-19 NOTE — TELEPHONE ENCOUNTER
Routing refill request to provider for review/approval because:  Patient needs to be seen because:  6 month visit due  ACT out of range      ISRAEL ScottN, RN

## 2022-07-21 RX ORDER — ALBUTEROL SULFATE 90 UG/1
AEROSOL, METERED RESPIRATORY (INHALATION)
Qty: 25.5 G | Refills: 1 | Status: SHIPPED | OUTPATIENT
Start: 2022-07-21 | End: 2022-12-20

## 2022-10-09 ENCOUNTER — HEALTH MAINTENANCE LETTER (OUTPATIENT)
Age: 52
End: 2022-10-09

## 2022-12-09 ENCOUNTER — VIRTUAL VISIT (OUTPATIENT)
Dept: FAMILY MEDICINE | Facility: CLINIC | Age: 52
End: 2022-12-09
Payer: COMMERCIAL

## 2022-12-09 DIAGNOSIS — J45.20 MILD INTERMITTENT ASTHMA WITHOUT COMPLICATION: ICD-10-CM

## 2022-12-09 DIAGNOSIS — I25.10 CORONARY ARTERY DISEASE INVOLVING NATIVE CORONARY ARTERY OF NATIVE HEART WITHOUT ANGINA PECTORIS: ICD-10-CM

## 2022-12-09 DIAGNOSIS — J06.9 UPPER RESPIRATORY TRACT INFECTION, UNSPECIFIED TYPE: Primary | ICD-10-CM

## 2022-12-09 PROCEDURE — 99203 OFFICE O/P NEW LOW 30 MIN: CPT | Mod: 95 | Performed by: FAMILY MEDICINE

## 2022-12-09 RX ORDER — AZITHROMYCIN 250 MG/1
TABLET, FILM COATED ORAL
Qty: 6 TABLET | Refills: 0 | Status: SHIPPED | OUTPATIENT
Start: 2022-12-09 | End: 2023-03-17

## 2022-12-09 ASSESSMENT — ASTHMA QUESTIONNAIRES
ACT_TOTALSCORE: 22
QUESTION_2 LAST FOUR WEEKS HOW OFTEN HAVE YOU HAD SHORTNESS OF BREATH: NOT AT ALL
QUESTION_3 LAST FOUR WEEKS HOW OFTEN DID YOUR ASTHMA SYMPTOMS (WHEEZING, COUGHING, SHORTNESS OF BREATH, CHEST TIGHTNESS OR PAIN) WAKE YOU UP AT NIGHT OR EARLIER THAN USUAL IN THE MORNING: NOT AT ALL
QUESTION_4 LAST FOUR WEEKS HOW OFTEN HAVE YOU USED YOUR RESCUE INHALER OR NEBULIZER MEDICATION (SUCH AS ALBUTEROL): TWO OR THREE TIMES PER WEEK
QUESTION_1 LAST FOUR WEEKS HOW MUCH OF THE TIME DID YOUR ASTHMA KEEP YOU FROM GETTING AS MUCH DONE AT WORK, SCHOOL OR AT HOME: NONE OF THE TIME
ACT_TOTALSCORE: 22
QUESTION_5 LAST FOUR WEEKS HOW WOULD YOU RATE YOUR ASTHMA CONTROL: WELL CONTROLLED

## 2022-12-09 NOTE — PROGRESS NOTES
Henrique is a 52 year old who is being evaluated via a billable telephone visit.      What phone number would you like to be contacted at? [phone]   How would you like to obtain your AVS? Macho    Assessment & Plan     Upper respiratory tract infection, unspecified type  My first visit with patient previous history reviewed with him and in his chart.  Came down with illness symptoms about a week ago.  Cough is the primary thing that causes him issues but he is having some other associated symptoms.  Feels like it is getting a bit better.  Was out of work for a few days this week and simply needs a note.  Because the weekend is coming up I agreed to send in a prescription for Zithromax and he will not fill it unless things are worsening.  - azithromycin (ZITHROMAX) 250 MG tablet; Two tabs today.  One tab daily x 4 days.    Coronary artery disease involving native coronary artery of native heart without angina pectoris  As above    Mild intermittent asthma without complication  Staying stable.  Does not feel like this is an asthma attack.           See Patient Instructions    Return in about 1 week (around 12/16/2022) for If not improving as expected, Servoy message.    Melanie Paris MD  [facility]    Meaghan Duenas is a 52 year old, presenting for the following health issues:  URI and Forms      Needs note for Tuesday until Friday for work   Seems like he is getting a little bit better, but still not great.     History of Present Illness       Reason for visit:  Unable to get rid of a cold  Symptom onset:  3-7 days ago  Symptoms include:  Runny nose, stuffy head, mild headache, very persistent cough, slight shortness of breath  Symptom intensity:  Moderate  Symptom progression:  Staying the same  Had these symptoms before:  No  What makes it worse:  Increased activity  What makes it better:  Rest    He eats 0-1 servings of fruits and vegetables daily.He consumes 0 sweetened  beverage(s) daily.He exercises with enough effort to increase his heart rate 20 to 29 minutes per day.  He exercises with enough effort to increase his heart rate 4 days per week.   He is taking medications regularly.         Visit with patient today for ongoing illness symptoms for roughly 1 week.    Review of Systems   Constitutional, HEENT, cardiovascular, pulmonary, gi and gu systems are negative, except as otherwise noted.      Objective           Vitals:  No vitals were obtained today due to virtual visit.    Physical Exam   healthy, alert and no distress  PSYCH: Alert and oriented times 3; coherent speech, normal   rate and volume, able to articulate logical thoughts, able   to abstract reason, no tangential thoughts, no hallucinations   or delusions  His affect is normal  RESP: No cough, no audible wheezing, able to talk in full sentences  Remainder of exam unable to be completed due to telephone visits    Past labs reviewed with the patient.             Phone call duration: 9 minutes

## 2022-12-09 NOTE — LETTER
[facility]  [address] [address]  Phone: [phone]    December 9, 2022        Henrique Nelson  [address] [facility] [id number]          To whom it may concern:    RE: Henrique Nelson    Patient was seen and treated today at our clinic.  Off work due to non-COVID illness 12/6 -12/9/2022.    Please contact me for questions or concerns.      Sincerely,        Melanie Paris MD

## 2022-12-19 DIAGNOSIS — J20.9 ACUTE BRONCHITIS, UNSPECIFIED ORGANISM: ICD-10-CM

## 2022-12-20 RX ORDER — ALBUTEROL SULFATE 90 UG/1
AEROSOL, METERED RESPIRATORY (INHALATION)
Qty: 25.5 G | Refills: 1 | Status: SHIPPED | OUTPATIENT
Start: 2022-12-20 | End: 2023-05-01

## 2022-12-20 NOTE — TELEPHONE ENCOUNTER
"Requested Prescriptions   Pending Prescriptions Disp Refills    albuterol (PROAIR HFA/PROVENTIL HFA/VENTOLIN HFA) 108 (90 Base) MCG/ACT inhaler [Pharmacy Med Name: ALBUTEROL SULFATE  AERS] 25.5 g 1     Sig: INHALE 2 PUFFS INTO THE LUNGS EVERY 6 HOURS AS NEEDED FOR SHORTNESS OF BREATH, DIFFICULTY BREATHING OR WHEEZING.       Asthma Maintenance Inhalers - Anticholinergics Failed - 12/19/2022 12:28 PM        Failed - Recent (6 mo) or future (30 days) visit within the authorizing provider's specialty     Patient had office visit in the last 6 months or has a visit in the next 30 days with authorizing provider or within the authorizing provider's specialty.  See \"Patient Info\" tab in inbasket, or \"Choose Columns\" in Meds & Orders section of the refill encounter.            Passed - Patient is age 12 years or older        Passed - Asthma control assessment score within normal limits in last 6 months     Please review ACT score.           Passed - Medication is active on med list       Short-Acting Beta Agonist Inhalers Protocol  Failed - 12/19/2022 12:28 PM        Failed - Recent (6 mo) or future (30 days) visit within the authorizing provider's specialty     Patient had office visit in the last 6 months or has a visit in the next 30 days with authorizing provider or within the authorizing provider's specialty.  See \"Patient Info\" tab in inbasket, or \"Choose Columns\" in Meds & Orders section of the refill encounter.            Passed - Patient is age 12 or older        Passed - Asthma control assessment score within normal limits in last 6 months     Please review ACT score.           Passed - Medication is active on med list             "

## 2023-01-24 DIAGNOSIS — E78.5 HYPERLIPIDEMIA LDL GOAL <130: ICD-10-CM

## 2023-01-25 ENCOUNTER — TELEPHONE (OUTPATIENT)
Dept: FAMILY MEDICINE | Facility: CLINIC | Age: 53
End: 2023-01-25
Payer: COMMERCIAL

## 2023-01-25 DIAGNOSIS — E78.5 HYPERLIPIDEMIA LDL GOAL <130: ICD-10-CM

## 2023-01-25 RX ORDER — ATORVASTATIN CALCIUM 80 MG/1
TABLET, FILM COATED ORAL
Qty: 90 TABLET | Refills: 0 | OUTPATIENT
Start: 2023-01-25

## 2023-01-25 RX ORDER — ATORVASTATIN CALCIUM 80 MG/1
80 TABLET, FILM COATED ORAL DAILY
Qty: 90 TABLET | Refills: 0 | Status: SHIPPED | OUTPATIENT
Start: 2023-01-25 | End: 2023-05-01

## 2023-01-25 NOTE — TELEPHONE ENCOUNTER
Pending Prescriptions:                       Disp   Refills    atorvastatin (LIPITOR) 80 MG tablet [Pharm*90 tab*0        Sig: TAKE ONE TABLET BY MOUTH ONCE DAILY      Routing refill request to provider for review/approval because:  Labs not current:  ldl    Ev Youngblood RN on 1/25/2023 at 2:58 PM

## 2023-01-25 NOTE — TELEPHONE ENCOUNTER
Henrique has not been seen here for years and does need to schedule a follow up visit. I will give him 90 tabs to give him some time to schedule a visit.  Electronically signed by Greg Schoen, MD

## 2023-03-14 ASSESSMENT — ENCOUNTER SYMPTOMS
ABDOMINAL PAIN: 0
EYE PAIN: 0
SORE THROAT: 0
SHORTNESS OF BREATH: 1
WEAKNESS: 0
HEARTBURN: 0
CHILLS: 0
HEMATURIA: 0
HEADACHES: 0
NERVOUS/ANXIOUS: 0
ARTHRALGIAS: 0
COUGH: 0
CONSTIPATION: 0
PARESTHESIAS: 0
FEVER: 0
JOINT SWELLING: 0
DIARRHEA: 0
HEMATOCHEZIA: 0
MYALGIAS: 0
DIZZINESS: 0
PALPITATIONS: 0
FREQUENCY: 0
DYSURIA: 0
NAUSEA: 0

## 2023-03-17 ENCOUNTER — OFFICE VISIT (OUTPATIENT)
Dept: FAMILY MEDICINE | Facility: CLINIC | Age: 53
End: 2023-03-17
Payer: COMMERCIAL

## 2023-03-17 VITALS
RESPIRATION RATE: 20 BRPM | HEART RATE: 74 BPM | TEMPERATURE: 97.2 F | HEIGHT: 70 IN | WEIGHT: 247 LBS | SYSTOLIC BLOOD PRESSURE: 132 MMHG | BODY MASS INDEX: 35.36 KG/M2 | DIASTOLIC BLOOD PRESSURE: 84 MMHG | OXYGEN SATURATION: 99 %

## 2023-03-17 DIAGNOSIS — E66.01 MORBID OBESITY (H): ICD-10-CM

## 2023-03-17 DIAGNOSIS — Z12.5 SCREENING FOR PROSTATE CANCER: ICD-10-CM

## 2023-03-17 DIAGNOSIS — Z23 VACCINE FOR DIPHTHERIA-TETANUS: ICD-10-CM

## 2023-03-17 DIAGNOSIS — Z00.00 ANNUAL PHYSICAL EXAM: Primary | ICD-10-CM

## 2023-03-17 DIAGNOSIS — E78.5 HYPERLIPIDEMIA LDL GOAL <130: ICD-10-CM

## 2023-03-17 DIAGNOSIS — Z23 NEED FOR VACCINATION: ICD-10-CM

## 2023-03-17 DIAGNOSIS — I25.10 CORONARY ARTERY DISEASE INVOLVING NATIVE CORONARY ARTERY OF NATIVE HEART WITHOUT ANGINA PECTORIS: ICD-10-CM

## 2023-03-17 LAB
ALT SERPL W P-5'-P-CCNC: 35 U/L (ref 10–50)
ANION GAP SERPL CALCULATED.3IONS-SCNC: 9 MMOL/L (ref 7–15)
BUN SERPL-MCNC: 16.7 MG/DL (ref 6–20)
CALCIUM SERPL-MCNC: 8.9 MG/DL (ref 8.6–10)
CHLORIDE SERPL-SCNC: 106 MMOL/L (ref 98–107)
CHOLEST SERPL-MCNC: 129 MG/DL
CREAT SERPL-MCNC: 1.2 MG/DL (ref 0.67–1.17)
DEPRECATED HCO3 PLAS-SCNC: 24 MMOL/L (ref 22–29)
GFR SERPL CREATININE-BSD FRML MDRD: 72 ML/MIN/1.73M2
GLUCOSE SERPL-MCNC: 90 MG/DL (ref 70–99)
HDLC SERPL-MCNC: 40 MG/DL
LDLC SERPL CALC-MCNC: 58 MG/DL
NONHDLC SERPL-MCNC: 89 MG/DL
POTASSIUM SERPL-SCNC: 4.3 MMOL/L (ref 3.4–5.3)
PSA SERPL DL<=0.01 NG/ML-MCNC: 0.48 NG/ML (ref 0–3.5)
SODIUM SERPL-SCNC: 139 MMOL/L (ref 136–145)
TRIGL SERPL-MCNC: 157 MG/DL

## 2023-03-17 PROCEDURE — 84460 ALANINE AMINO (ALT) (SGPT): CPT | Performed by: FAMILY MEDICINE

## 2023-03-17 PROCEDURE — 80048 BASIC METABOLIC PNL TOTAL CA: CPT | Performed by: FAMILY MEDICINE

## 2023-03-17 PROCEDURE — 90714 TD VACC NO PRESV 7 YRS+ IM: CPT | Performed by: FAMILY MEDICINE

## 2023-03-17 PROCEDURE — 99396 PREV VISIT EST AGE 40-64: CPT | Performed by: FAMILY MEDICINE

## 2023-03-17 PROCEDURE — G0103 PSA SCREENING: HCPCS | Performed by: FAMILY MEDICINE

## 2023-03-17 PROCEDURE — 90471 IMMUNIZATION ADMIN: CPT | Performed by: FAMILY MEDICINE

## 2023-03-17 PROCEDURE — 80061 LIPID PANEL: CPT | Performed by: FAMILY MEDICINE

## 2023-03-17 PROCEDURE — 36415 COLL VENOUS BLD VENIPUNCTURE: CPT | Performed by: FAMILY MEDICINE

## 2023-03-17 ASSESSMENT — ENCOUNTER SYMPTOMS
CONSTIPATION: 0
PARESTHESIAS: 0
ARTHRALGIAS: 0
HEMATURIA: 0
DIARRHEA: 0
WEAKNESS: 0
NAUSEA: 0
FREQUENCY: 0
CHILLS: 0
HEARTBURN: 0
JOINT SWELLING: 0
EYE PAIN: 0
FEVER: 0
ABDOMINAL PAIN: 0
MYALGIAS: 0
COUGH: 0
DIZZINESS: 0
DYSURIA: 0
NERVOUS/ANXIOUS: 0
SHORTNESS OF BREATH: 1
SORE THROAT: 0
HEADACHES: 0
HEMATOCHEZIA: 0
PALPITATIONS: 0

## 2023-03-17 ASSESSMENT — PAIN SCALES - GENERAL: PAINLEVEL: NO PAIN (0)

## 2023-03-17 NOTE — NURSING NOTE
Prior to immunization administration, verified patients identity using patient s name and date of birth. Please see Immunization Activity for additional information.     Screening Questionnaire for Adult Immunization    Are you sick today?   No   Do you have allergies to medications, food, a vaccine component or latex?   No   Have you ever had a serious reaction after receiving a vaccination?   No   Do you have a long-term health problem with heart, lung, kidney, or metabolic disease (e.g., diabetes), asthma, a blood disorder, no spleen, complement component deficiency, a cochlear implant, or a spinal fluid leak?  Are you on long-term aspirin therapy?   YES   Do you have cancer, leukemia, HIV/AIDS, or any other immune system problem?   No   Do you have a parent, brother, or sister with an immune system problem?   No   In the past 3 months, have you taken medications that affect  your immune system, such as prednisone, other steroids, or anticancer drugs; drugs for the treatment of rheumatoid arthritis, Crohn s disease, or psoriasis; or have you had radiation treatments?   No   Have you had a seizure, or a brain or other nervous system problem?   No   During the past year, have you received a transfusion of blood or blood    products, or been given immune (gamma) globulin or antiviral drug?   No   For women: Are you pregnant or is there a chance you could become       pregnant during the next month?   No   Have you received any vaccinations in the past 4 weeks?   No     Immunization questionnaire was positive for at least one answer.  Notified Dr Schoen and Ok to give.    MP/MA.         Patient instructed to remain in clinic for 15 minutes afterwards, and to report any adverse reaction to me immediately.       Screening performed by Lois Hope on 3/17/2023 at 1:48 PM.

## 2023-03-17 NOTE — PROGRESS NOTES
SUBJECTIVE:   CC: Henrique is an 52 year old who presents for preventative health visit.     Healthy Habits:     Getting at least 3 servings of Calcium per day:  Yes    Bi-annual eye exam:  Yes    Dental care twice a year:  Yes    Sleep apnea or symptoms of sleep apnea:  None    Diet:  Regular (no restrictions)    Frequency of exercise:  2-3 days/week    Duration of exercise:  15-30 minutes    Taking medications regularly:  Yes    Medication side effects:  None    PHQ-2 Total Score: 0    Additional concerns today:  No    Henrique is here for wellness visit and follow up on coronary artery disease. In 2010 he had an acute coronary event with placement of a stent in his second diagonal off the LAD which had a 75%  ostial narrowing and 100% mid occlusion with successful attainment of YADIEL 3 flow. He has done well since that time and was initially on ASA, statin, betablocker and ACE-I. Over the years in follow up with cardiology, his meds have been pared down to just ASA and 80 mg atorvastatin daily, which he takes without side effects.  He is able to do physical labor, such as a lot of shoveling/snow removal this winter without symptoms other than some shortness of breath.  His weight has waxed and waned over the years between 220s - 260s, currently at 247.      He notes his asthma is really exercised induced and will pretreat with a couple puffs of albuterol before doing more vigorous activity or if he gets a little  short of breath with activity will then take two puffs and can go forward without symptoms.     Current Outpatient Medications   Medication Sig Dispense Refill     albuterol (PROAIR HFA/PROVENTIL HFA/VENTOLIN HFA) 108 (90 Base) MCG/ACT inhaler INHALE 2 PUFFS INTO THE LUNGS EVERY 6 HOURS AS NEEDED FOR SHORTNESS OF BREATH, DIFFICULTY BREATHING OR WHEEZING. 25.5 g 1     ASPIRIN 325 MG PO TBEC ONE DAILY  3     atorvastatin (LIPITOR) 80 MG tablet Take 1 tablet (80 mg) by mouth daily 90 tablet 0     albuterol  (PROVENTIL) (2.5 MG/3ML) 0.083% neb solution Take 1 vial (2.5 mg) by nebulization every 6 hours as needed for shortness of breath / dyspnea or wheezing (Patient not taking: Reported on 3/17/2023) 1 Box 3               Today's PHQ-2 Score:   PHQ-2 (  [employer]) 3/14/2023   Q1: Little interest or pleasure in doing things 0   Q2: Feeling down, depressed or hopeless 0   PHQ-2 Score 0   PHQ-2 Total Score (12-17 Years)- Positive if 3 or more points; Administer PHQ-A if positive -   Q1: Little interest or pleasure in doing things Not at all   Q2: Feeling down, depressed or hopeless Not at all   PHQ-2 Score 0         Social History     Tobacco Use     Smoking status: Former     Types: Cigars     Quit date: 2010     Years since quittin.8     Smokeless tobacco: Never     Tobacco comments:     occasional cigar   Substance Use Topics     Alcohol use: Yes     Alcohol/week: 0.0 standard drinks     Comment: occasional         Alcohol Use 3/14/2023   Prescreen: >3 drinks/day or >7 drinks/week? No   no    Last PSA:   PSA   Date Value Ref Range Status   2019 0.67 0 - 4 ug/L Final     Comment:     Assay Method:  Chemiluminescence using Siemens Vista analyzer         Reviewed and updated as needed this visit by clinical staff                  Reviewed and updated as needed this visit by Provider                     Review of Systems   Constitutional: Negative for chills and fever.   HENT: Negative for congestion, ear pain, hearing loss and sore throat.    Eyes: Negative for pain and visual disturbance.   Respiratory: Positive for shortness of breath. Negative for cough.    Cardiovascular: Negative for chest pain, palpitations and peripheral edema.   Gastrointestinal: Negative for abdominal pain, constipation, diarrhea, heartburn, hematochezia and nausea.   Genitourinary: Negative for dysuria, frequency, genital sores, hematuria, impotence, penile discharge and urgency.   Musculoskeletal: Negative for arthralgias, joint  "swelling and myalgias.   Skin: Negative for rash.   Neurological: Negative for dizziness, weakness, headaches and paresthesias.   Psychiatric/Behavioral: Negative for mood changes. The patient is not nervous/anxious.          OBJECTIVE:   /84   Pulse 74   Temp 97.2  F (36.2  C) (Temporal)   Resp 20   Ht 1.765 m (5' 9.5\")   Wt 112 kg (247 lb)   SpO2 99%   BMI 35.95 kg/m      Physical Exam  GENERAL: healthy, alert and no distress  EYES: Eyes grossly normal to inspection, PERRL and conjunctivae and sclerae normal  HENT: ear canals and TM's normal, nose and mouth without ulcers or lesions  NECK: no adenopathy, no asymmetry, masses, or scars and thyroid normal to palpation  RESP: lungs clear to auscultation - no rales, rhonchi or wheezes  CV: regular rate and rhythm, normal S1 S2, no S3 or S4, no murmur, click or rub, no peripheral edema and peripheral pulses strong  ABDOMEN: soft, nontender, no hepatosplenomegaly, no masses and bowel sounds normal  MS: no gross musculoskeletal defects noted, no edema  SKIN: no suspicious lesions or rashes  NEURO: Normal strength and tone, mentation intact and speech normal  PSYCH: mentation appears normal, affect normal/bright    Results for orders placed or performed in visit on 03/17/23   Lipid panel reflex to direct LDL Non-fasting     Status: Abnormal   Result Value Ref Range    Cholesterol 129 <200 mg/dL    Triglycerides 157 (H) <150 mg/dL    Direct Measure HDL 40 >=40 mg/dL    LDL Cholesterol Calculated 58 <=100 mg/dL    Non HDL Cholesterol 89 <130 mg/dL    Narrative    Cholesterol  Desirable:  <200 mg/dL    Triglycerides  Normal:  Less than 150 mg/dL  Borderline High:  150-199 mg/dL  High:  200-499 mg/dL  Very High:  Greater than or equal to 500 mg/dL    Direct Measure HDL  Female:  Greater than or equal to 50 mg/dL   Male:  Greater than or equal to 40 mg/dL    LDL Cholesterol  Desirable:  <100mg/dL  Above Desirable:  100-129 mg/dL   Borderline High:  130-159 mg/dL "   High:  160-189 mg/dL   Very High:  >= 190 mg/dL    Non HDL Cholesterol  Desirable:  130 mg/dL  Above Desirable:  130-159 mg/dL  Borderline High:  160-189 mg/dL  High:  190-219 mg/dL  Very High:  Greater than or equal to 220 mg/dL   ALT     Status: Normal   Result Value Ref Range    ALT 35 10 - 50 U/L   Basic metabolic panel  (Ca, Cl, CO2, Creat, Gluc, K, Na, BUN)     Status: Abnormal   Result Value Ref Range    Sodium 139 136 - 145 mmol/L    Potassium 4.3 3.4 - 5.3 mmol/L    Chloride 106 98 - 107 mmol/L    Carbon Dioxide (CO2) 24 22 - 29 mmol/L    Anion Gap 9 7 - 15 mmol/L    Urea Nitrogen 16.7 6.0 - 20.0 mg/dL    Creatinine 1.20 (H) 0.67 - 1.17 mg/dL    Calcium 8.9 8.6 - 10.0 mg/dL    Glucose 90 70 - 99 mg/dL    GFR Estimate 72 >60 mL/min/1.73m2   PSA, screen     Status: Normal   Result Value Ref Range    Prostate Specific Antigen Screen 0.48 0.00 - 3.50 ng/mL    Narrative    This result is obtained using the Roche Elecsys total PSA method on the jake e601 immunoassay analyzer. Results obtained with different assay methods or kits cannot be used interchangeably.         ASSESSMENT/PLAN:   (Z00.00) Annual physical exam  (primary encounter diagnosis)  Comment: Exam and history unremarkable for any acute issues. We did review several vaccines that are indicated as care gaps and he declined any of these with the exception of a TD booster.    Plan: TD booster update, up to date on colon cancer screening, annual exams recommended.     (I25.10) Coronary artery disease involving native coronary artery of native heart without angina pectoris  Comment: Clinically stable on statin and ASA.  LDL is low at 58 but HDL also low at 40.  His culprit vessel back in 2010 was the only vessel over 20%, so will continue with atorvastatin.   Plan: No change in ASA and statin.  BP is okay but will monitor and if goes up, would suggest adding in a beta blocker to his regimen.      (E78.5) Hyperlipidemia LDL goal <130  Comment: See  comments above      (E66.01) Morbid obesity (H)  Comment: Discussed mediterranean diet, exercise  Plan: given info on diet    (Z23) Vaccine for diphtheria-tetanus  Plan: TD (Adult), PF, Adsorbed (TDVAX) [IMM09]        (Z12.5) Screening for prostate cancer  Comment: PSA very low; can follow every few years.     COUNSELING:   Reviewed preventive health counseling, as reflected in patient instructions       Regular exercise       Healthy diet/nutrition        He reports that he quit smoking about 12 years ago. His smoking use included cigars. He has never used smokeless tobacco.        Gregory G. Schoen, MD  [facility]

## 2023-04-27 DIAGNOSIS — E78.5 HYPERLIPIDEMIA LDL GOAL <130: ICD-10-CM

## 2023-04-27 DIAGNOSIS — J20.9 ACUTE BRONCHITIS, UNSPECIFIED ORGANISM: ICD-10-CM

## 2023-05-01 RX ORDER — ATORVASTATIN CALCIUM 80 MG/1
TABLET, FILM COATED ORAL
Qty: 90 TABLET | Refills: 3 | Status: SHIPPED | OUTPATIENT
Start: 2023-05-01 | End: 2024-05-20

## 2023-05-01 RX ORDER — ALBUTEROL SULFATE 90 UG/1
AEROSOL, METERED RESPIRATORY (INHALATION)
Qty: 25.5 G | Refills: 3 | Status: SHIPPED | OUTPATIENT
Start: 2023-05-01 | End: 2024-02-20

## 2023-05-01 NOTE — TELEPHONE ENCOUNTER
Prescription approved per [facility] Refill Protocol.  Ev Youngblood RN on 5/1/2023 at 8:37 AM

## 2023-10-25 ENCOUNTER — NURSE TRIAGE (OUTPATIENT)
Dept: FAMILY MEDICINE | Facility: CLINIC | Age: 53
End: 2023-10-25
Payer: COMMERCIAL

## 2023-10-25 DIAGNOSIS — U07.1 INFECTION DUE TO 2019 NOVEL CORONAVIRUS: Primary | ICD-10-CM

## 2023-10-25 NOTE — TELEPHONE ENCOUNTER
RN COVID TREATMENT VISIT  10/25/23      The patient has been triaged and does not require a higher level of care.    Henrique Nelson  53 year old  Current weight? 247 lbs    Has the patient been seen by a primary care provider at an [facility] or [facility] within the past two years? Yes.   Have you been in close proximity to/do you have a known exposure to a person with a confirmed case of influenza? No.     General treatment eligibility:  Date of positive COVID test (PCR or at home)?  10/25/23    Are you or have you been hospitalized for this COVID-19 infection? No.   Have you received monoclonal antibodies or antiviral treatment for COVID-19 since this positive test? No.   Do you have any of the following conditions that place you at risk of being very sick from COVID-19?   - Age 50 years or older  - Chronic lung diseases such as asthma, bronchiectasis, COPD, interstitial lung disease, pulmonary embolism, pulmonary hypertension   Yes, patient has at least one high risk condition as noted above.     Current COVID symptoms:   - shortness of breath or difficulty breathing  - fatigue  - muscle or body aches  - headache  - congestion or runny nose  Yes. Patient has at least one symptom as selected.     How many days since symptoms started? 5 days or less. Established patient, 12 years or older weighing at least 88.2 lbs, who has symptoms that started in the past 5 days, has not been hospitalized nor received treatment already, and is at risk for being very sick from COVID-19.     Treatment eligibility by RN:  Are you currently pregnant or nursing? No  Do you have a clinically significant hypersensitivity to nirmatrelvir or ritonavir, or toxic epidermal necrolysis (TEN) or Garcia-Felix Syndrome? No  Do you have a history of hepatitis, any hepatic impairment on the Problem List (such as Child-Barr Class C, cirrhosis, fatty liver disease, alcoholic liver disease), or was the last liver lab  (hepatic panel, ALT, AST, ALK Phos, bilirubin) elevated in the past 6 months? No  Do you have any history of severe renal impairment (eGFR < 30mL/min)? No    Is patient eligible to continue? Yes, patient meets all eligibility requirements for the RN COVID treatment (as denoted by all no responses above).     Current Outpatient Medications   Medication Sig Dispense Refill    albuterol (PROAIR HFA/PROVENTIL HFA/VENTOLIN HFA) 108 (90 Base) MCG/ACT inhaler INHALE 2 PUFFS INTO THE LUNGS EVERY 6 HOURS AS NEEDED FOR SHORTNESS OF BREATH, DIFFICULTY BREATHING OR WHEEZING. 25.5 g 3    albuterol (PROVENTIL) (2.5 MG/3ML) 0.083% neb solution Take 1 vial (2.5 mg) by nebulization every 6 hours as needed for shortness of breath / dyspnea or wheezing (Patient not taking: Reported on 3/17/2023) 1 Box 3    ASPIRIN 325 MG PO TBEC ONE DAILY  3    atorvastatin (LIPITOR) 80 MG tablet TAKE ONE TABLET BY MOUTH ONCE DAILY 90 tablet 3       Medications from List 1 of the standing order (on medications that exclude the use of Paxlovid) that patient is taking: NONE. Is patient taking Nitta Yuma's Wort? No  Is patient taking Nitta Yuma's Wort or any meds from List 1? No.   Medications from List 2 of the standing order (on meds that provider needs to adjust) that patient is taking: NONE. Is patient on any of the meds from List 2? No.   Medications from List 3 of standing order (on meds that a RN needs to adjust) that patient is taking: atorvastatin (Lipitor): Instructed patient to stop atorvastatin while taking Paxlovid and restart atorvastatin 1 day after the completion of Paxlovid.  Is patient on any meds from List 3? Yes. Patient is on meds from list 3. No meds require a provider visit and at least one med required RN to adjust.     Paxlovid has an approximate 90% reduction in hospitalization. Paxlovid can possibly cause altered sense of taste, diarrhea (loose, watery stools), high blood pressure, muscle aches.     Would patient like a Paxlovid  prescription?   Yes.   Lab Results   Component Value Date    GFRESTIMATED 72 03/17/2023       Was last eGFR reduced? No, eGFR 60 or greater/ No Result on record. Patient can receive the normal renal function dose. Paxlovid Rx sent to [place] pharmacy   [facility]    Temporary change to home medications: Stop Atorvastatin 6 days    All medication adjustments (holds, etc) were discussed with the patient and patient was asked to repeat back (teachback) their med adjustment.  Did patient understand med adjustment? Yes, patient repeated back and understood correctly.        Reviewed the following instructions with the patient:    Paxlovid (nimatrelvir and ritonavir)    How it works  Two medicines (nirmatrelvir and ritonavir) are taken together. They stop the virus from growing. Less amount of virus is easier for your body to fight.    How to take  Medicine comes in a daily container with both medicine tablets. Take by mouth twice daily (once in the morning, once at night) for 5 days.  The number of tablets to take varies by patient.  Don't chew or break capsules. Swallow whole.    When to take  Take as soon as possible after positive COVID-19 test result, and within 5 days of your first symptoms.    Possible side effects  Can cause altered sense of taste, diarrhea (loose, watery stools), high blood pressure, muscle aches.    Srinivas Curiel RN       Reason for Disposition   MILD difficulty breathing (e.g., minimal/no SOB at rest, SOB with walking, pulse <100)    Additional Information   Negative: SEVERE difficulty breathing (e.g., struggling for each breath, speaks in single words)   Negative: Difficult to awaken or acting confused (e.g., disoriented, slurred speech)   Negative: Bluish (or gray) lips or face now   Negative: Shock suspected (e.g., cold/pale/clammy skin, too weak to stand, low BP, rapid pulse)   Negative: Sounds like a life-threatening emergency to the triager   Negative: SEVERE or constant  chest pain or pressure  (Exception: Mild central chest pain, present only when coughing.)   Negative: MODERATE difficulty breathing (e.g., speaks in phrases, SOB even at rest, pulse 100-120)   Negative: Headache and stiff neck (can't touch chin to chest)   Negative: Oxygen level (e.g., pulse oximetry) 90% or lower   Negative: Chest pain or pressure  (Exception: MILD central chest pain, present only when coughing.)   Negative: Drinking very little and dehydration suspected (e.g., no urine > 12 hours, very dry mouth, very lightheaded)   Negative: Patient sounds very sick or weak to the triager    Protocols used: Coronavirus (COVID-19) Diagnosed or [id number]

## 2023-10-25 NOTE — TELEPHONE ENCOUNTER
COVID Positive/Requesting COVID treatment    Patient is positive for COVID and requesting treatment options.    Date of positive COVID test (PCR or at home)? 10/25/23  Current COVID symptoms: cough, muscle or body aches, headache, sore throat, and congestion or runny nose  Date COVID symptoms began: 10/23/23    Message should be routed to clinic RN pool. Best phone number to use for call back: [phone]    He has asthma and a history of heart attack.    Ev Youngblood RN on 10/25/2023 at 11:40 AM

## 2024-01-16 ASSESSMENT — ASTHMA QUESTIONNAIRES
ACT_TOTALSCORE: 19
QUESTION_4 LAST FOUR WEEKS HOW OFTEN HAVE YOU USED YOUR RESCUE INHALER OR NEBULIZER MEDICATION (SUCH AS ALBUTEROL): ONE OR TWO TIMES PER DAY
QUESTION_5 LAST FOUR WEEKS HOW WOULD YOU RATE YOUR ASTHMA CONTROL: WELL CONTROLLED
QUESTION_2 LAST FOUR WEEKS HOW OFTEN HAVE YOU HAD SHORTNESS OF BREATH: ONCE OR TWICE A WEEK
QUESTION_1 LAST FOUR WEEKS HOW MUCH OF THE TIME DID YOUR ASTHMA KEEP YOU FROM GETTING AS MUCH DONE AT WORK, SCHOOL OR AT HOME: A LITTLE OF THE TIME
ACT_TOTALSCORE: 19
QUESTION_3 LAST FOUR WEEKS HOW OFTEN DID YOUR ASTHMA SYMPTOMS (WHEEZING, COUGHING, SHORTNESS OF BREATH, CHEST TIGHTNESS OR PAIN) WAKE YOU UP AT NIGHT OR EARLIER THAN USUAL IN THE MORNING: NOT AT ALL

## 2024-01-19 ENCOUNTER — OFFICE VISIT (OUTPATIENT)
Dept: INTERNAL MEDICINE | Facility: CLINIC | Age: 54
End: 2024-01-19
Payer: COMMERCIAL

## 2024-01-19 VITALS
WEIGHT: 244.8 LBS | DIASTOLIC BLOOD PRESSURE: 88 MMHG | SYSTOLIC BLOOD PRESSURE: 138 MMHG | HEART RATE: 78 BPM | TEMPERATURE: 98.1 F | OXYGEN SATURATION: 99 % | RESPIRATION RATE: 16 BRPM | BODY MASS INDEX: 35.05 KG/M2 | HEIGHT: 70 IN

## 2024-01-19 DIAGNOSIS — M79.645 PAIN OF FINGER OF LEFT HAND: ICD-10-CM

## 2024-01-19 DIAGNOSIS — S46.911A STRAIN OF RIGHT SHOULDER, INITIAL ENCOUNTER: Primary | ICD-10-CM

## 2024-01-19 DIAGNOSIS — E66.01 MORBID OBESITY (H): ICD-10-CM

## 2024-01-19 PROCEDURE — 99213 OFFICE O/P EST LOW 20 MIN: CPT | Performed by: INTERNAL MEDICINE

## 2024-01-19 RX ORDER — PREDNISONE 20 MG/1
TABLET ORAL
Qty: 20 TABLET | Refills: 0 | Status: SHIPPED | OUTPATIENT
Start: 2024-01-19

## 2024-01-19 ASSESSMENT — PAIN SCALES - GENERAL: PAINLEVEL: MODERATE PAIN (4)

## 2024-01-19 NOTE — PROGRESS NOTES
"Meaghan Duenas is a 53 year old, presenting for the following health issues:  No chief complaint on file.    History of Present Illness       Reason for visit:  Shoulder and finger pain  Symptom onset:  More than a month  Symptoms include:  Pain in shoulder and finger  Symptom intensity:  Moderate  Symptom progression:  Staying the same  Had these symptoms before:  No  What makes it worse:  No  What makes it better:  No    He eats 0-1 servings of fruits and vegetables daily.He consumes 4 sweetened beverage(s) daily.He exercises with enough effort to increase his heart rate 20 to 29 minutes per day.  He exercises with enough effort to increase his heart rate 5 days per week.   He is taking medications regularly.           EMR reviewed including:             Complaint, History of Chief Complaint, Corresponding Review of Systems, and Complaint Specific Physical Examination.    #1   Left shoulder pain  Several weeks in duration  No trauma or injury  Has failed over-the-counter anti-inflammatories at prescription strength  Full range of motion with discomfort.  Minimal crepitance.  No point tenderness.        Exam:   MS: Minimal crepitance is noted in the extremities. No deformity is present. Muscle strength is appropriate and equal bilaterally. No acute joint erythema or swelling is present.   NEURO: Pt is alert and appropriate. No neurologic lateralization is noted. Cranial nerves 2-12 are intact. Peripheral sensory and motor function are grossly normal.       #2   Discomfort in the left index finger.  Decreased range of motion slightly.  Difficult to make a fist.  No redness or inflammation.  No history of trauma.  No nail changes.  No evidence of ischemia.        Patient has been interviewed, applicable history and applied review of systems have been performed.    Vital Signs:   /88   Pulse 78   Temp 98.1  F (36.7  C) (Temporal)   Resp 16   Ht 1.765 m (5' 9.5\")   Wt 111 kg (244 lb 12.8 oz)   SpO2 " 99%   BMI 35.63 kg/m        Decision Making    Problem and Complexity     1. Strain of right shoulder, initial encounter  Short trial of prednisone.  If no improvement, set up x-ray/MRI.  Followed by orthopedic consultation.    - predniSONE (DELTASONE) 20 MG tablet; Take 3 tabs by mouth daily x 3 days, then 2 tabs daily x 3 days, then 1 tab daily x 3 days, then 1/2 tab daily x 3 days.  Dispense: 20 tablet; Refill: 0    2. Morbid obesity (H)  Recommend weight loss responsible caloric restriction and exercise    3. Pain of finger of left hand  As above                                  FOLLOW UP   I have asked the patient to make an appointment for followup with myself or his primary care provider in 2 weeks virtually with results and response    Regarding routine vaccinations:  I have reviewed the patient's vaccination schedule and discussed the benefits of prophylactic vaccination in detail.  I recommend the patient contact their pharmacist for vaccinations.  Discussed that most insurance companies now favor reimbursement to the pharmacies and it will financially behoove the patient to have vaccinations performed at their pharmacy.        I have carefully explained the diagnosis and treatment options to the patient.  The patient has displayed an understanding of the above, and all subsequent questions were answered.      DO KALYAN Arizmendi    Portions of this note were produced using [employer]  Although every attempt at real-time proof reading has been made, occasional grammar/syntax errors may have been missed.

## 2024-01-29 ENCOUNTER — MYC MEDICAL ADVICE (OUTPATIENT)
Dept: FAMILY MEDICINE | Facility: CLINIC | Age: 54
End: 2024-01-29
Payer: COMMERCIAL

## 2024-02-19 DIAGNOSIS — J20.9 ACUTE BRONCHITIS, UNSPECIFIED ORGANISM: ICD-10-CM

## 2024-02-20 RX ORDER — ALBUTEROL SULFATE 90 UG/1
AEROSOL, METERED RESPIRATORY (INHALATION)
Qty: 25.5 G | Refills: 11 | Status: SHIPPED | OUTPATIENT
Start: 2024-02-20

## 2024-03-25 ENCOUNTER — OFFICE VISIT (OUTPATIENT)
Dept: FAMILY MEDICINE | Facility: CLINIC | Age: 54
End: 2024-03-25
Payer: COMMERCIAL

## 2024-03-25 VITALS
HEIGHT: 69 IN | OXYGEN SATURATION: 96 % | TEMPERATURE: 98 F | DIASTOLIC BLOOD PRESSURE: 86 MMHG | BODY MASS INDEX: 35.4 KG/M2 | SYSTOLIC BLOOD PRESSURE: 136 MMHG | HEART RATE: 77 BPM | WEIGHT: 239 LBS

## 2024-03-25 DIAGNOSIS — I25.10 CORONARY ARTERY DISEASE INVOLVING NATIVE CORONARY ARTERY OF NATIVE HEART WITHOUT ANGINA PECTORIS: ICD-10-CM

## 2024-03-25 DIAGNOSIS — E78.5 HYPERLIPIDEMIA LDL GOAL <130: ICD-10-CM

## 2024-03-25 DIAGNOSIS — F17.200 NICOTINE DEPENDENCE, UNCOMPLICATED, UNSPECIFIED NICOTINE PRODUCT TYPE: ICD-10-CM

## 2024-03-25 DIAGNOSIS — Z00.00 WELL ADULT EXAM: Primary | ICD-10-CM

## 2024-03-25 DIAGNOSIS — J45.20 MILD INTERMITTENT ASTHMA WITHOUT COMPLICATION: ICD-10-CM

## 2024-03-25 LAB
ALT SERPL W P-5'-P-CCNC: 47 U/L (ref 0–70)
ANION GAP SERPL CALCULATED.3IONS-SCNC: 12 MMOL/L (ref 7–15)
BUN SERPL-MCNC: 19.7 MG/DL (ref 6–20)
CALCIUM SERPL-MCNC: 9.3 MG/DL (ref 8.6–10)
CHLORIDE SERPL-SCNC: 105 MMOL/L (ref 98–107)
CHOLEST SERPL-MCNC: 123 MG/DL
CREAT SERPL-MCNC: 1.11 MG/DL (ref 0.67–1.17)
DEPRECATED HCO3 PLAS-SCNC: 21 MMOL/L (ref 22–29)
EGFRCR SERPLBLD CKD-EPI 2021: 78 ML/MIN/1.73M2
FASTING STATUS PATIENT QL REPORTED: YES
GLUCOSE SERPL-MCNC: 98 MG/DL (ref 70–99)
HDLC SERPL-MCNC: 45 MG/DL
LDLC SERPL CALC-MCNC: 63 MG/DL
NONHDLC SERPL-MCNC: 78 MG/DL
POTASSIUM SERPL-SCNC: 4.6 MMOL/L (ref 3.4–5.3)
SODIUM SERPL-SCNC: 138 MMOL/L (ref 135–145)
TRIGL SERPL-MCNC: 77 MG/DL

## 2024-03-25 PROCEDURE — 80061 LIPID PANEL: CPT | Performed by: FAMILY MEDICINE

## 2024-03-25 PROCEDURE — 99396 PREV VISIT EST AGE 40-64: CPT | Performed by: FAMILY MEDICINE

## 2024-03-25 PROCEDURE — 84460 ALANINE AMINO (ALT) (SGPT): CPT | Performed by: FAMILY MEDICINE

## 2024-03-25 PROCEDURE — 36415 COLL VENOUS BLD VENIPUNCTURE: CPT | Performed by: FAMILY MEDICINE

## 2024-03-25 PROCEDURE — 80048 BASIC METABOLIC PNL TOTAL CA: CPT | Performed by: FAMILY MEDICINE

## 2024-03-25 RX ORDER — ALBUTEROL SULFATE AND BUDESONIDE 90; 80 UG/1; UG/1
2 AEROSOL, METERED RESPIRATORY (INHALATION) 2 TIMES DAILY
Qty: 10.7 G | Refills: 3 | Status: SHIPPED | OUTPATIENT
Start: 2024-03-25

## 2024-03-25 RX ORDER — METOPROLOL SUCCINATE 25 MG/1
25 TABLET, EXTENDED RELEASE ORAL DAILY
Qty: 90 TABLET | Refills: 3 | Status: SHIPPED | OUTPATIENT
Start: 2024-03-25

## 2024-03-25 NOTE — PATIENT INSTRUCTIONS
"Learning About Benefits of Quitting Smoking  Quitting smoking helps your body in many ways. Quitting lowers your risk for cancer, lung disease, heart attack, stroke, blood vessel disease, and blindness. You'll also get sick less often and heal faster. And after you quit, you may find that your mood is better and you are less stressed.  When and how will you feel healthier after quitting smoking?    In the first hours or days:    Your blood pressure and heart rate go down.  Your oxygen levels increase.    Within weeks or months:    You will cough less and breathe deeper. It may be easier to be active.  Your sense of taste and smell should return.    Over the years:    Your risks of heart disease, heart attack, and stroke are lower.  Your risk of lung cancer is cut by about half after about 10 years. And your risk for other cancers is lower too.  How would quitting help others in your life?    Their heart, lung, and cancer risks may drop, much like yours. They will also be sick less.   If you are or will be pregnant someday, quitting smoking means a healthier .   Where can you learn more?  Go to https://www.[employer].net/patiented  Enter O319 in the search box to learn more about \"Learning About Benefits of Quitting Smoking.\"  Current as of: November 15, 2023               Content Version: 14.0    0578-5708 [employer].   Care instructions adapted under license by your healthcare professional. If you have questions about a medical condition or this instruction, always ask your healthcare professional. [employer]wise, [email or web address] disclaims any warranty or liability for your use of this information.      "

## 2024-03-25 NOTE — PROGRESS NOTES
Preventive Care Visit  Formerly [facility]  Gregory G. Schoen, MD, Family Medicine  Mar 25, 2024      Assessment & Plan     Well adult exam  Doing well overall in regard to symptom management. See comments below regarding asthma.  He is up to date on colon cancer screening and his two PSA levels on file, both below 1.0 and last year at 0.48.  He has no symptoms so we agreed we do not need to check that annually.  We discussed vaccines and he will proceed with covid shot in the fall.  Notes the last flu shot he had resulted in getting the flu 4 times that winter and is not interested in any future shots.  He realizes there was not cause and effect but states he doesn't want any further.  We discussed his risks with asthma and coronary disease and he did agree to Prevnar 20 at this time. Annual exams recommended and recheck of blood pressure every 6 months.  He has no interest in stopping his intermittent cigar smoking.     Coronary artery disease involving native coronary artery of native heart without angina pectoris  Clinically is doing well without any antianginals, only on ASA and atorvastatin.  I discussed with him the clinical data behind secondary prevention, which includes betablocker therapy, as well as his BP being borderline high since stopping that.  He is willing to go back on this so will start him back on toprol XL 25 mg daily and follow up on blood pressures.  He will call if any side effects or issues.      Hyperlipidemia LDL goal <130  Well controlled on statin therapy with total 123, HDL 45 and LDL 63; no change indicated.  ALT was normal.     Mild intermittent asthma without complication  We discussed the addition of use of prn steroid when he is more symptomatic with URIs, COVID, etc. and he noted he saw an add for Airsupra and would like to try that, which is an albuterol/budesonide combo that is use prn rather than as a controller.  I agreed to give that a try and  "suggested that he use this bid (4 puffs total) along with additional albuterol as needed.  If he notes no improvement with this approach, might consider flovent at a higher dose or more frequent daily use of airsupra. Prevnar 20 given today      Nicotine dependence, uncomplicated, unspecified nicotine product type  Discussed cessation of his cigar smoking and he is not interested.     Nicotine/Tobacco Cessation  He reports that he has been smoking cigars. He has never used smokeless tobacco.  Nicotine/Tobacco Cessation Plan  Information offered: Patient not interested at this time      BMI  Estimated body mass index is 35 kg/m  as calculated from the following:    Height as of this encounter: 1.76 m (5' 9.29\").    Weight as of this encounter: 108.4 kg (239 lb).   Weight management plan: Discussed healthy diet and exercise guidelines    Counseling  Appropriate preventive services were discussed with this patient, including applicable screening as appropriate for fall prevention, nutrition, physical activity, Tobacco-use cessation, weight loss and cognition.  Checklist reviewing preventive services available has been given to the patient.  Reviewed patient's diet, addressing concerns and/or questions.   He is at risk for lack of exercise and has been provided with information to increase physical activity for the benefit of his well-being.   He is at risk for psychosocial distress and has been provided with information to reduce risk.           Meaghan Duenas is a 53 year old, presenting for the following:  Physical          3/25/2024     2:17 PM   Additional Questions   Roomed by Andie MORA CMA   Accompanied by self        Health Care Directive  Patient does not have a Health Care Directive or Living Will: Discussed advance care planning with patient; however, patient declined at this time.    HPI  Here for annual wellness exam.      Had Covid last October.  Since then asthma a little worse, but getting better. Has " only been using albuterol, no controller.  Was using one inhaler a month back in October/November but now is down to just once a day. Breathing has otherwise been fine.  He had received all prior covid shots before the newest came out last fall but he did not get that one.  Notes that his weight was better until he got covid and went back up about 20 pounds.  Through portion control and healthier eating he was able to get down to 218 pounds (BMI would still be around 31) and notes he felt better at that weight.      Coronary disease has been stable.  He does have a stent in place and recalls being on plavix and lisinopril for a year after that and was on 50mg of metoprolol XL for a few years, but states that the provider he last saw in cardiology told him since he was normotensive and not having angina, that he did not need the betablocker and stopped it.  Henrique states he had no side effects from this medication. Since then his BP has crept up a bit but as it is today, near, but not above 140/90.  He remains on 325mg ASA daily along with atorvastatin 80mg daily, which he tolerates well without myalgias. He denies chest pain but does note if he is working in cold weather outside he dose get central chest discomfort with breathing and thinks that is more of his asthma as if he gets out of the cold it immediately goes away.  Last echo with EF of 65% or higher and no valve issues.     Also notes that he has been getting some migratory joint aches.  Started in right shoulder, seemingly out of the blue without injury and eventually just went away.  Not long afterward the left shoulder did the same thing.  He has more chronic pains in the right hand, third and fourth fingers in the PIP and DIP joints as well as the left index finger.  He has history of left 4th finger dislocation a few years ago but that is not bothering him. No redness, swelling, etc.     We reviewed his tobacco use history and he has been an occasional  cigar smoker for much of his adult life, having one usually not more than once daily but also can go weeks in between having one.  He has never smoked cigarettes.           3/19/2024   General Health   How would you rate your overall physical health? Good   Feel stress (tense, anxious, or unable to sleep) Only a little   (!) STRESS CONCERN      3/19/2024   Nutrition   Three or more servings of calcium each day? (!) NO   Diet: Regular (no restrictions)   How many servings of fruit and vegetables per day? (!) 2-3   How many sweetened beverages each day? (!) 3         3/19/2024   Exercise   Days per week of moderate/strenous exercise 3 days   Average minutes spent exercising at this level 20 min         3/19/2024   Social Factors   Frequency of gathering with friends or relatives Once a week   Worry food won't last until get money to buy more No   Food not last or not have enough money for food? No   Do you have housing?  Yes   Are you worried about losing your housing? No   Lack of transportation? No   Unable to get utilities (heat,electricity)? No         3/19/2024   Fall Risk   Fallen 2 or more times in the past year? No   Trouble with walking or balance? No          3/19/2024   Dental   Dentist two times every year? Yes         3/19/2024   TB Screening   Were you born outside of the [place]? No         Today's PHQ-2 Score:       3/25/2024     1:11 PM   PHQ-2 ( 1999 [employer])   Q1: Little interest or pleasure in doing things 0   Q2: Feeling down, depressed or hopeless 0   PHQ-2 Score 0   Q1: Little interest or pleasure in doing things Not at all   Q2: Feeling down, depressed or hopeless Not at all   PHQ-2 Score 0           3/19/2024   Substance Use   If I could quit smoking, I would Neutral   I want to quit somking, worry about health affects Neutral   Willing to make a plan to quit smoking Neutral   Willing to cut down before quitting Neutral   Alcohol more than 3/day or more than 7/wk No   Do you use any other substances  "recreationally? No     Social History     Tobacco Use    Smoking status: Some Days     Types: Cigars     Last attempt to quit: 2010     Years since quittin.9    Smokeless tobacco: Never    Tobacco comments:     Occasional cigars   Vaping Use    Vaping Use: Never used   Substance Use Topics    Alcohol use: Yes     Comment: occasional    Drug use: No           3/19/2024   STI Screening   New sexual partner(s) since last STI/HIV test? No   Last PSA:   PSA   Date Value Ref Range Status   2019 0.67 0 - 4 ug/L Final     Comment:     Assay Method:  Chemiluminescence using Siemens Vista analyzer     Prostate Specific Antigen Screen   Date Value Ref Range Status   2023 0.48 0.00 - 3.50 ng/mL Final     ASCVD Risk   The ASCVD Risk score (Ramon MURPHY, et al., 2019) failed to calculate for the following reasons:    The patient has a prior MI or stroke diagnosis    Current Outpatient Medications   Medication Sig Dispense Refill    albuterol (PROAIR HFA/PROVENTIL HFA/VENTOLIN HFA) 108 (90 Base) MCG/ACT inhaler INHALE 2 PUFFS INTO THE LUNGS EVERY 6 HOURS AS NEEDED FOR SHORTNESS OF BREATH, DIFFICULTY BREATHING OR WHEEZING. 25.5 g 11    albuterol (PROVENTIL) (2.5 MG/3ML) 0.083% neb solution Take 1 vial (2.5 mg) by nebulization every 6 hours as needed for shortness of breath / dyspnea or wheezing 1 Box 3    ASPIRIN 325 MG PO TBEC ONE DAILY  3    atorvastatin (LIPITOR) 80 MG tablet TAKE ONE TABLET BY MOUTH ONCE DAILY 90 tablet 3                                 Review of Systems  Constitutional, neuro, ENT, endocrine, pulmonary, cardiac, gastrointestinal, genitourinary, musculoskeletal, integument and psychiatric systems are negative, except as otherwise noted.     Objective    Exam  /86   Pulse 77   Temp 98  F (36.7  C) (Temporal)   Ht 1.76 m (5' 9.29\")   Wt 108.4 kg (239 lb)   SpO2 96%   BMI 35.00 kg/m     Estimated body mass index is 35 kg/m  as calculated from the following:    Height as of " "this encounter: 1.76 m (5' 9.29\").    Weight as of this encounter: 108.4 kg (239 lb).    Physical Exam  GENERAL: alert and no distress  EYES: Eyes grossly normal to inspection, PERRL, EOMI and conjunctivae and sclerae normal  HENT: ear canals and TM's normal, nose and mouth without ulcers or lesions  NECK: no adenopathy, no asymmetry, masses, or scars  RESP: lungs clear to auscultation - no rales, rhonchi or wheezes  CV: regular rate and rhythm, normal S1 S2, no S3 or S4, no murmur, click or rub, no peripheral edema  ABDOMEN: soft, nontender, no hepatosplenomegaly, no masses and bowel sounds normal  MS: no gross musculoskeletal defects noted, no edema. Exam of involved fingers at this time show no nodes, no erythema or tenderness, able to make a full fist but he notes that is bothersome and he feels it weakens his grasp if he incorporates the involved fingers.   SKIN: no suspicious lesions or rashes  NEURO: Normal strength and tone, mentation intact and speech normal  PSYCH: mentation appears normal, affect normal/bright        Results for orders placed or performed in visit on 03/25/24   Lipid panel reflex to direct LDL Fasting     Status: None   Result Value Ref Range    Cholesterol 123 <200 mg/dL    Triglycerides 77 <150 mg/dL    Direct Measure HDL 45 >=40 mg/dL    LDL Cholesterol Calculated 63 <=100 mg/dL    Non HDL Cholesterol 78 <130 mg/dL    Patient Fasting > 8hrs? Yes     Narrative    Cholesterol  Desirable:  <200 mg/dL    Triglycerides  Normal:  Less than 150 mg/dL  Borderline High:  150-199 mg/dL  High:  200-499 mg/dL  Very High:  Greater than or equal to 500 mg/dL    Direct Measure HDL  Female:  Greater than or equal to 50 mg/dL   Male:  Greater than or equal to 40 mg/dL    LDL Cholesterol  Desirable:  <100mg/dL  Above Desirable:  100-129 mg/dL   Borderline High:  130-159 mg/dL   High:  160-189 mg/dL   Very High:  >= 190 mg/dL    Non HDL Cholesterol  Desirable:  130 mg/dL  Above Desirable:  130-159 " mg/dL  Borderline High:  160-189 mg/dL  High:  190-219 mg/dL  Very High:  Greater than or equal to 220 mg/dL   Basic metabolic panel  (Ca, Cl, CO2, Creat, Gluc, K, Na, BUN)     Status: Abnormal   Result Value Ref Range    Sodium 138 135 - 145 mmol/L    Potassium 4.6 3.4 - 5.3 mmol/L    Chloride 105 98 - 107 mmol/L    Carbon Dioxide (CO2) 21 (L) 22 - 29 mmol/L    Anion Gap 12 7 - 15 mmol/L    Urea Nitrogen 19.7 6.0 - 20.0 mg/dL    Creatinine 1.11 0.67 - 1.17 mg/dL    GFR Estimate 78 >60 mL/min/1.73m2    Calcium 9.3 8.6 - 10.0 mg/dL    Glucose 98 70 - 99 mg/dL   ALT     Status: Normal   Result Value Ref Range    ALT 47 0 - 70 U/L             Signed Electronically by: Gregory G. Schoen, MD

## 2024-03-26 ENCOUNTER — TELEPHONE (OUTPATIENT)
Dept: FAMILY MEDICINE | Facility: CLINIC | Age: 54
End: 2024-03-26
Payer: COMMERCIAL

## 2024-03-26 NOTE — TELEPHONE ENCOUNTER
----- Message from Gregory G Schoen, MD sent at 3/25/2024  4:49 PM CDT -----  Please contact Henrique and have him return for his Prevnar 20 shot, which I forgot to have him wait for in the exam room.    Electronically signed by Greg Schoen, MD

## 2024-04-12 ENCOUNTER — MYC MEDICAL ADVICE (OUTPATIENT)
Dept: FAMILY MEDICINE | Facility: CLINIC | Age: 54
End: 2024-04-12
Payer: COMMERCIAL

## 2024-04-12 NOTE — TELEPHONE ENCOUNTER
Patient states he has no pain. He states he feels like there is still more gas bubbles and gurgling.    No excessive gas, no problems with bowels, no nausea.    He is just wondering if he has to worry?    He is aware provider is out until 4/21/24.    Ev Youngblood RN on 4/12/2024 at 2:41 PM

## 2024-04-25 NOTE — TELEPHONE ENCOUNTER
See MyChart message to patient.  Most often, increased bowel sounds are related to something more acute, such as a viral infection, ingestion of foods that produce more gas.  In absence of pain, nausea, vomiting, diarrhea, blood in his stool or black tarry stools, would recommend dairy free (except yogurt) in his diet for a week or two and consider adding in a probiotic.  If any of the above symptoms are present and persisting/worsening, should be seen.  Electronically signed by Greg Schoen, MD

## 2024-05-20 DIAGNOSIS — E78.5 HYPERLIPIDEMIA LDL GOAL <130: ICD-10-CM

## 2024-05-20 RX ORDER — ATORVASTATIN CALCIUM 80 MG/1
TABLET, FILM COATED ORAL
Qty: 90 TABLET | Refills: 2 | Status: SHIPPED | OUTPATIENT
Start: 2024-05-20

## 2024-08-06 ENCOUNTER — NURSE TRIAGE (OUTPATIENT)
Dept: FAMILY MEDICINE | Facility: CLINIC | Age: 54
End: 2024-08-06

## 2024-08-06 NOTE — TELEPHONE ENCOUNTER
S-(situation): Headaches, coughing    B-(background): Patient was at a baseball tournament all day Saturday and Sunday. Saturday night patient noticed a scratchy throat. On Sunday, he started coughing so hard he felt like he could throw up. He developed a headache and sinus drainage. Patient does have a history of asthma and feels like it is flaring up. He states he feels SOB when he is coughing so hard. He otherwise denies SOB while at rest/exertion. Patient states he has a decreased appetite but denies dehydrations.     A-(assessment): Per RN ptotocol, patient should be seen in office today.     R-(recommendations): RN is not concerned for heatstroke or dehydration. RN is more concerned about patient having gotten a viral illness as patient was in crowds of 2,000 people. RN recommended patient to take a home covid test and call back with results. If negative patient would like to come to appt. If positive, will discuss other options. RN reviewed red flag symptoms with patient and when to seek emergency care.     Reason for Disposition   SEVERE coughing spells (e.g., whooping sound after coughing, vomiting after coughing)    Additional Information   Negative: Bluish (or gray) lips or face   Negative: SEVERE difficulty breathing (e.g., struggling for each breath, speaks in single words)   Negative: Rapid onset of cough and has hives   Negative: Coughing started suddenly after medicine, an allergic food or bee sting   Negative: Difficulty breathing after exposure to flames, smoke, or fumes   Negative: Sounds like a life-threatening emergency to the triager   Negative: Previous asthma attacks and this feels like asthma attack   Negative: Dry cough (non-productive; no sputum or minimal clear sputum) and within 14 days of COVID-19 Exposure   Negative: MODERATE difficulty breathing (e.g., speaks in phrases, SOB even at rest, pulse 100-120) and still present when not coughing   Negative: Chest pain present when not  coughing   Negative: Passed out (i.e., fainted, collapsed and was not responding)   Negative: Patient sounds very sick or weak to the triager   Negative: MILD difficulty breathing (e.g., minimal/no SOB at rest, SOB with walking, pulse <100) and still present when not coughing   Negative: Coughed up > 1 tablespoon (15 ml) blood (Exception: Blood-tinged sputum.)   Negative: Fever > 103 F (39.4 C)   Negative: Fever > 101 F (38.3 C) and over 60 years of age   Negative: Fever > 100.0 F (37.8 C) and has diabetes mellitus or a weak immune system (e.g., HIV positive, cancer chemotherapy, organ transplant, splenectomy, chronic steroids)   Negative: Fever > 100.0 F (37.8 C) and bedridden (e.g., CVA, chronic illness, recovering from surgery)   Negative: Increasing ankle swelling   Negative: Wheezing is present    Protocols used: Cough-A-OH

## 2024-08-06 NOTE — TELEPHONE ENCOUNTER
Patient was just added to Marily Ely schedule today for Overexposure to sun, headache, SOB. She would like patient triaged if this is worries of heat stroke or dehydration patient would need to be seen in emergency department per Marily Ely please triage.     Azeb Castillo MA

## 2025-02-24 ENCOUNTER — TELEPHONE (OUTPATIENT)
Dept: FAMILY MEDICINE | Facility: CLINIC | Age: 55
End: 2025-02-24
Payer: COMMERCIAL

## 2025-02-24 ENCOUNTER — PATIENT OUTREACH (OUTPATIENT)
Dept: CARE COORDINATION | Facility: CLINIC | Age: 55
End: 2025-02-24
Payer: COMMERCIAL

## 2025-02-24 NOTE — TELEPHONE ENCOUNTER
Reason for Call:  Appointment Request    Patient requesting this type of appt:  Preventive     Requested provider: Schoen, Gregory G    Reason patient unable to be scheduled: Not within requested timeframe    When does patient want to be seen/preferred time:  Anytime after 03/25    Comments: Unable to see a schedule for this provider to be loaded. Please call patient back to schedule annual.    Could we send this information to you in [place] or would you prefer to receive a phone call?:   Patient would prefer a phone call   Okay to leave a detailed message?: Yes at Cell number on file:    Telephone Information:   Mobile [phone]       Call taken on 2/24/2025 at 1:39 PM by Radha POSEY Record

## 2025-03-10 DIAGNOSIS — E78.5 HYPERLIPIDEMIA LDL GOAL <130: ICD-10-CM

## 2025-03-10 RX ORDER — ATORVASTATIN CALCIUM 80 MG/1
TABLET, FILM COATED ORAL
Qty: 90 TABLET | Refills: 0 | Status: SHIPPED | OUTPATIENT
Start: 2025-03-10

## 2025-03-31 SDOH — HEALTH STABILITY: PHYSICAL HEALTH: ON AVERAGE, HOW MANY MINUTES DO YOU ENGAGE IN EXERCISE AT THIS LEVEL?: 10 MIN

## 2025-03-31 SDOH — HEALTH STABILITY: PHYSICAL HEALTH: ON AVERAGE, HOW MANY DAYS PER WEEK DO YOU ENGAGE IN MODERATE TO STRENUOUS EXERCISE (LIKE A BRISK WALK)?: 3 DAYS

## 2025-03-31 ASSESSMENT — ASTHMA QUESTIONNAIRES
QUESTION_2 LAST FOUR WEEKS HOW OFTEN HAVE YOU HAD SHORTNESS OF BREATH: THREE TO SIX TIMES A WEEK
QUESTION_5 LAST FOUR WEEKS HOW WOULD YOU RATE YOUR ASTHMA CONTROL: WELL CONTROLLED
QUESTION_1 LAST FOUR WEEKS HOW MUCH OF THE TIME DID YOUR ASTHMA KEEP YOU FROM GETTING AS MUCH DONE AT WORK, SCHOOL OR AT HOME: NONE OF THE TIME
ACT_TOTALSCORE: 19
QUESTION_3 LAST FOUR WEEKS HOW OFTEN DID YOUR ASTHMA SYMPTOMS (WHEEZING, COUGHING, SHORTNESS OF BREATH, CHEST TIGHTNESS OR PAIN) WAKE YOU UP AT NIGHT OR EARLIER THAN USUAL IN THE MORNING: ONCE OR TWICE
QUESTION_4 LAST FOUR WEEKS HOW OFTEN HAVE YOU USED YOUR RESCUE INHALER OR NEBULIZER MEDICATION (SUCH AS ALBUTEROL): TWO OR THREE TIMES PER WEEK

## 2025-03-31 ASSESSMENT — SOCIAL DETERMINANTS OF HEALTH (SDOH): HOW OFTEN DO YOU GET TOGETHER WITH FRIENDS OR RELATIVES?: ONCE A WEEK

## 2025-04-04 ENCOUNTER — OFFICE VISIT (OUTPATIENT)
Dept: FAMILY MEDICINE | Facility: CLINIC | Age: 55
End: 2025-04-04
Payer: COMMERCIAL

## 2025-04-04 VITALS
SYSTOLIC BLOOD PRESSURE: 134 MMHG | TEMPERATURE: 96.7 F | RESPIRATION RATE: 18 BRPM | BODY MASS INDEX: 34.16 KG/M2 | WEIGHT: 230.6 LBS | DIASTOLIC BLOOD PRESSURE: 90 MMHG | HEART RATE: 79 BPM | OXYGEN SATURATION: 97 % | HEIGHT: 69 IN

## 2025-04-04 DIAGNOSIS — I25.10 CORONARY ARTERY DISEASE INVOLVING NATIVE CORONARY ARTERY OF NATIVE HEART WITHOUT ANGINA PECTORIS: ICD-10-CM

## 2025-04-04 DIAGNOSIS — J45.20 MILD INTERMITTENT ASTHMA WITHOUT COMPLICATION: ICD-10-CM

## 2025-04-04 DIAGNOSIS — Z12.5 SCREENING FOR PROSTATE CANCER: ICD-10-CM

## 2025-04-04 DIAGNOSIS — R03.0 ELEVATED BLOOD PRESSURE READING WITHOUT DIAGNOSIS OF HYPERTENSION: ICD-10-CM

## 2025-04-04 DIAGNOSIS — E78.5 HYPERLIPIDEMIA LDL GOAL <130: ICD-10-CM

## 2025-04-04 DIAGNOSIS — Z00.00 WELL ADULT EXAM: Primary | ICD-10-CM

## 2025-04-04 LAB
ALT SERPL W P-5'-P-CCNC: 36 U/L (ref 0–70)
ANION GAP SERPL CALCULATED.3IONS-SCNC: 10 MMOL/L (ref 7–15)
BUN SERPL-MCNC: 20.4 MG/DL (ref 6–20)
CALCIUM SERPL-MCNC: 9.3 MG/DL (ref 8.8–10.4)
CHLORIDE SERPL-SCNC: 107 MMOL/L (ref 98–107)
CHOLEST SERPL-MCNC: 107 MG/DL
CREAT SERPL-MCNC: 1.16 MG/DL (ref 0.67–1.17)
EGFRCR SERPLBLD CKD-EPI 2021: 74 ML/MIN/1.73M2
FASTING STATUS PATIENT QL REPORTED: YES
FASTING STATUS PATIENT QL REPORTED: YES
GLUCOSE SERPL-MCNC: 101 MG/DL (ref 70–99)
HCO3 SERPL-SCNC: 23 MMOL/L (ref 22–29)
HDLC SERPL-MCNC: 42 MG/DL
LDLC SERPL CALC-MCNC: 49 MG/DL
NONHDLC SERPL-MCNC: 65 MG/DL
POTASSIUM SERPL-SCNC: 4.5 MMOL/L (ref 3.4–5.3)
PSA SERPL DL<=0.01 NG/ML-MCNC: 0.55 NG/ML (ref 0–3.5)
SODIUM SERPL-SCNC: 140 MMOL/L (ref 135–145)
TRIGL SERPL-MCNC: 81 MG/DL

## 2025-04-04 PROCEDURE — 80061 LIPID PANEL: CPT | Performed by: FAMILY MEDICINE

## 2025-04-04 PROCEDURE — 36415 COLL VENOUS BLD VENIPUNCTURE: CPT | Performed by: FAMILY MEDICINE

## 2025-04-04 PROCEDURE — G0103 PSA SCREENING: HCPCS | Performed by: FAMILY MEDICINE

## 2025-04-04 PROCEDURE — 84460 ALANINE AMINO (ALT) (SGPT): CPT | Performed by: FAMILY MEDICINE

## 2025-04-04 PROCEDURE — 80048 BASIC METABOLIC PNL TOTAL CA: CPT | Performed by: FAMILY MEDICINE

## 2025-04-04 PROCEDURE — 99396 PREV VISIT EST AGE 40-64: CPT | Performed by: FAMILY MEDICINE

## 2025-04-04 RX ORDER — CARVEDILOL 6.25 MG/1
6.25 TABLET ORAL 2 TIMES DAILY WITH MEALS
Qty: 60 TABLET | Refills: 3 | Status: SHIPPED | OUTPATIENT
Start: 2025-04-04

## 2025-04-04 RX ORDER — ATORVASTATIN CALCIUM 80 MG/1
80 TABLET, FILM COATED ORAL DAILY
Qty: 90 TABLET | Refills: 3 | Status: SHIPPED | OUTPATIENT
Start: 2025-04-04

## 2025-04-04 ASSESSMENT — PAIN SCALES - GENERAL: PAINLEVEL_OUTOF10: NO PAIN (0)

## 2025-04-04 NOTE — PROGRESS NOTES
"dPreventive Care Visit  [facility]  Gregory G. Schoen, MD, Family Medicine  Apr 4, 2025      Assessment & Plan     Well adult exam  No acute issues reported and exam unremarkable. BP is borderline and is having fatigue from metoprolol.  See comments below.  Up to date for one more year on colon cancer screening.  Will check PSA.  Discussed available vaccines and at this time declines.  Annual exams recommended.   - REVIEW OF HEALTH MAINTENANCE PROTOCOL ORDERS    Coronary artery disease involving native coronary artery of native heart without angina pectoris  Clinically stable on GDMT. Not tolerating metoprolol so will switch to coreg and see if less fatigue and better BP control. Renal function and electrolytes are normal. Statin well tolerated with excellent results.  No change in meds.         Hyperlipidemia LDL goal <130  See comments above.  No changes in meds.        Mild intermittent asthma without complication  Well controlled on his controller med with once to twice a week use of rescue albuterol.  No changes indicated.      Elevated blood pressure (not hypertension)  BP remains borderline on metoprolol.  Due to fatigue, will switch to co-reg and see if we can get better BP control as well as less fatigue. He will report back in about 3 weeks regarding impact of this change.    - carvedilol (COREG) 6.25 MG tablet; Take 1 tablet (6.25 mg) by mouth 2 times daily (with meals).    Screening for prostate cancer  PSA remains normal and below 1.0.           Nicotine/Tobacco Cessation  He reports that he has been smoking cigars and cigarettes. He has never used smokeless tobacco.  Nicotine/Tobacco Cessation Plan  Smokes only cigars and a couple a day.  Does not smoke any cigarettes.  Not interested in making any changes.        BMI  Estimated body mass index is 33.77 kg/m  as calculated from the following:    Height as of this encounter: 1.76 m (5' 9.29\").    Weight as of this " encounter: 104.6 kg (230 lb 9.6 oz).   Weight management plan: Discussed healthy diet and exercise guidelines    Counseling  Appropriate preventive services were addressed with this patient via screening, questionnaire, or discussion as appropriate for fall prevention, nutrition, physical activity, Tobacco-use cessation, social engagement, weight loss and cognition.  Checklist reviewing preventive services available has been given to the patient.  Reviewed patient's diet, addressing concerns and/or questions.   He is at risk for lack of exercise and has been provided with information to increase physical activity for the benefit of his well-being.   He is at risk for psychosocial distress and has been provided with information to reduce risk.     6 month medication recheck recommended.          Meaghan Duenas is a 54 year old, presenting for the following:  Physical  Patient has no additional questions or concerns Today.       4/4/2025     1:14 PM   Additional Questions   Roomed by AD   Accompanied by Self          HPI  Here for annual well exam.     Regarding his asthma, is doing well with controller inhaler.  Only using albuterol just a couple of times a week.  No acute episodes.      BP better on metoprolol but overall feels more tired, less energy. Denies any chest pains, palpitations.      No issues with side effects from his meds other than the concern about regarding metoprolol.     Current Outpatient Medications   Medication Sig Dispense Refill    albuterol (PROAIR HFA/PROVENTIL HFA/VENTOLIN HFA) 108 (90 Base) MCG/ACT inhaler INHALE 2 PUFFS INTO THE LUNGS EVERY 6 HOURS AS NEEDED FOR SHORTNESS OF BREATH, DIFFICULTY BREATHING OR WHEEZING. 25.5 g 11    albuterol (PROVENTIL) (2.5 MG/3ML) 0.083% neb solution Take 1 vial (2.5 mg) by nebulization every 6 hours as needed for shortness of breath / dyspnea or wheezing 1 Box 3    Albuterol-Budesonide (AIRSUPRA) 90-80 MCG/ACT AERO Inhale 2 puffs into the lungs 2  times daily 10.7 g 3    ASPIRIN 325 MG PO TBEC ONE DAILY  3    atorvastatin (LIPITOR) 80 MG tablet Take 1 tablet (80 mg) by mouth daily. 90 tablet 3    carvedilol (COREG) 6.25 MG tablet Take 1 tablet (6.25 mg) by mouth 2 times daily (with meals). 60 tablet 3                3/31/2025   General Health   How would you rate your overall physical health? Good   Feel stress (tense, anxious, or unable to sleep) To some extent   (!) STRESS CONCERN      3/31/2025   Nutrition   Three or more servings of calcium each day? Yes   Diet: Regular (no restrictions)   How many servings of fruit and vegetables per day? (!) 0-1   How many sweetened beverages each day? (!) 2         3/31/2025   Exercise   Days per week of moderate/strenous exercise 3 days   Average minutes spent exercising at this level 10 min         3/31/2025   Social Factors   Frequency of gathering with friends or relatives Once a week   Worry food won't last until get money to buy more No   Food not last or not have enough money for food? No   Do you have housing? (Housing is defined as stable permanent housing and does not include staying ouside in a car, in a tent, in an abandoned building, in an overnight shelter, or couch-surfing.) Yes   Are you worried about losing your housing? No   Lack of transportation? No   Unable to get utilities (heat,electricity)? No         3/31/2025   Fall Risk   Fallen 2 or more times in the past year? No   Trouble with walking or balance? No          3/31/2025   Dental   Dentist two times every year? Yes           3/19/2024   TB Screening   Were you born outside of the [place]? No           Today's PHQ-2 Score:       4/3/2025     3:44 PM   PHQ-2 ( 1999 [employer])   Q1: Little interest or pleasure in doing things 0   Q2: Feeling down, depressed or hopeless 0   PHQ-2 Score 0    Q1: Little interest or pleasure in doing things Not at all   Q2: Feeling down, depressed or hopeless Not at all   PHQ-2 Score 0       Patient-reported            3/31/2025   Substance Use   Alcohol more than 3/day or more than 7/wk No   Do you use any other substances recreationally? No     Social History     Tobacco Use    Smoking status: Some Days     Current packs/day: 0.00     Types: Cigars, Cigarettes     Last attempt to quit: 2010     Years since quittin.9    Smokeless tobacco: Never    Tobacco comments:     Occasional cigars   Vaping Use    Vaping status: Never Used   Substance Use Topics    Alcohol use: Yes     Comment: occasional    Drug use: No         3/31/2025   STI Screening   New sexual partner(s) since last STI/HIV test? No   Last PSA:   PSA   Date Value Ref Range Status   2019 0.67 0 - 4 ug/L Final     Comment:     Assay Method:  Chemiluminescence using Siemens Vista analyzer     Prostate Specific Antigen Screen   Date Value Ref Range Status   2023 0.48 0.00 - 3.50 ng/mL Final     ASCVD Risk   The ASCVD Risk score (Ramon MURPHY, et al., 2019) failed to calculate for the following reasons:    Risk score cannot be calculated because patient has a medical history suggesting prior/existing ASCVD          Reviewed and updated as needed this visit by Provider                          Review of Systems  CONSTITUTIONAL: NEGATIVE for fever, chills, change in weight  INTEGUMENTARY/SKIN: NEGATIVE for worrisome rashes, moles or lesions  EYES: NEGATIVE for vision changes or irritation; eye exams every 2 years  ENT/MOUTH: NEGATIVE for ear, mouth and throat problems  RESP: NEGATIVE for significant cough or SOB  sCV: NEGATIVE for chest pain, palpitations or peripheral edema  GI: NEGATIVE for nausea, abdominal pain, heartburn, or change in bowel habits  : NEGATIVE for frequency, dysuria, or hematuria  MUSCULOSKELETAL: NEGATIVE for significant arthralgias or myalgia  NEURO: NEGATIVE for weakness, dizziness or paresthesias  ENDOCRINE: NEGATIVE for temperature intolerance, skin/hair changes  HEME: NEGATIVE for bleeding problems  PSYCHIATRIC:  "NEGATIVE for changes in mood or affect     Objective    Exam  /90 (BP Location: Right arm, Patient Position: Sitting, Cuff Size: Adult Regular)   Pulse 79   Temp (!) 96.7  F (35.9  C) (Temporal)   Resp 18   Ht 1.76 m (5' 9.29\")   Wt 104.6 kg (230 lb 9.6 oz)   SpO2 97%   BMI 33.77 kg/m     Estimated body mass index is 33.77 kg/m  as calculated from the following:    Height as of this encounter: 1.76 m (5' 9.29\").    Weight as of this encounter: 104.6 kg (230 lb 9.6 oz).    Physical Exam  GENERAL: alert and no distress  EYES: Eyes grossly normal to inspection, PERRL and conjunctivae and sclerae normal; fundi were clear  HENT: ear canals and TM's normal, nose and mouth without ulcers or lesions  NECK: no adenopathy, no asymmetry, masses, or scars  RESP: lungs clear to auscultation - no rales, rhonchi or wheezes  CV: regular rate and rhythm, normal S1 S2, no S3 or S4, no murmur, click or rub, no peripheral edema  ABDOMEN: soft, nontender, no hepatosplenomegaly, no masses and bowel sounds normal  MS: no gross musculoskeletal defects noted, no edema  SKIN: no suspicious lesions or rashes  NEURO: Normal strength and tone, mentation intact and speech normal  PSYCH: mentation appears normal, affect normal/bright    Results for orders placed or performed in visit on 04/04/25   Lipid panel reflex to direct LDL Non-fasting     Status: None   Result Value Ref Range    Cholesterol 107 <200 mg/dL    Triglycerides 81 <150 mg/dL    Direct Measure HDL 42 >=40 mg/dL    LDL Cholesterol Calculated 49 <100 mg/dL    Non HDL Cholesterol 65 <130 mg/dL    Patient Fasting > 8hrs? Yes     Narrative    Cholesterol  Desirable: < 200 mg/dL  Borderline High: 200 - 239 mg/dL  High: >= 240 mg/dL    Triglycerides  Normal: < 150 mg/dL  Borderline High: 150 - 199 mg/dL  High: 200-499 mg/dL  Very High: >= 500 mg/dL    Direct Measure HDL  Female: >= 50 mg/dL   Male: >= 40 mg/dL    LDL Cholesterol  Desirable: < 100 mg/dL  Above Desirable: 100 " - 129 mg/dL   Borderline High: 130 - 159 mg/dL   High:  160 - 189 mg/dL   Very High: >= 190 mg/dL    Non HDL Cholesterol  Desirable: < 130 mg/dL  Above Desirable: 130 - 159 mg/dL  Borderline High: 160 - 189 mg/dL  High: 190 - 219 mg/dL  Very High: >= 220 mg/dL   PSA, screen     Status: Normal   Result Value Ref Range    Prostate Specific Antigen Screen 0.55 0.00 - 3.50 ng/mL    Narrative    This result is obtained using the Roche Elecsys total PSA method on the jake [id number] immunoassay analyzer, which is an ultrasensitive method. Results obtained with different assay methods or kits cannot be used interchangeably.  This test is intended for initial prostate cancer screening. PSA values exceeding the age-specific limits are suspicious for prostate disease, but additional testing, such as prostate biopsy, is needed to diagnose prostate pathology. The American Cancer Society recommends annual examination with digital rectal examination and serum PSA beginning at age 50 and for men with a life expectancy of at least 10 years after detection of prostate cancer. For men in high-risk groups, such as  Americans or men with a first-degree relative diagnosed at a younger age, testing should begin at a younger age. It is generally recommended that information be provided to patients about the benefits and limitations of testing and treatment so they can make informed decisions.   Basic metabolic panel  (Ca, Cl, CO2, Creat, Gluc, K, Na, BUN)     Status: Abnormal   Result Value Ref Range    Sodium 140 135 - 145 mmol/L    Potassium 4.5 3.4 - 5.3 mmol/L    Chloride 107 98 - 107 mmol/L    Carbon Dioxide (CO2) 23 22 - 29 mmol/L    Anion Gap 10 7 - 15 mmol/L    Urea Nitrogen 20.4 (H) 6.0 - 20.0 mg/dL    Creatinine 1.16 0.67 - 1.17 mg/dL    GFR Estimate 74 >60 mL/min/1.73m2    Calcium 9.3 8.8 - 10.4 mg/dL    Glucose 101 (H) 70 - 99 mg/dL    Patient Fasting > 8hrs? Yes    ALT     Status: Normal   Result Value Ref Range    ALT 36 0  - 70 U/L             Signed Electronically by: Gregory G. Schoen, MD